# Patient Record
Sex: MALE | Race: WHITE | NOT HISPANIC OR LATINO | Employment: OTHER | URBAN - METROPOLITAN AREA
[De-identification: names, ages, dates, MRNs, and addresses within clinical notes are randomized per-mention and may not be internally consistent; named-entity substitution may affect disease eponyms.]

---

## 2020-02-04 ENCOUNTER — HOSPITAL ENCOUNTER (INPATIENT)
Facility: MEDICAL CENTER | Age: 70
LOS: 4 days | DRG: 871 | End: 2020-02-08
Attending: EMERGENCY MEDICINE | Admitting: INTERNAL MEDICINE
Payer: COMMERCIAL

## 2020-02-04 ENCOUNTER — APPOINTMENT (OUTPATIENT)
Dept: RADIOLOGY | Facility: MEDICAL CENTER | Age: 70
DRG: 871 | End: 2020-02-04
Attending: STUDENT IN AN ORGANIZED HEALTH CARE EDUCATION/TRAINING PROGRAM
Payer: COMMERCIAL

## 2020-02-04 ENCOUNTER — APPOINTMENT (OUTPATIENT)
Dept: CARDIOLOGY | Facility: MEDICAL CENTER | Age: 70
DRG: 871 | End: 2020-02-04
Attending: INTERNAL MEDICINE
Payer: COMMERCIAL

## 2020-02-04 ENCOUNTER — APPOINTMENT (OUTPATIENT)
Dept: RADIOLOGY | Facility: MEDICAL CENTER | Age: 70
DRG: 871 | End: 2020-02-04
Attending: EMERGENCY MEDICINE
Payer: COMMERCIAL

## 2020-02-04 ENCOUNTER — HOSPITAL ENCOUNTER (OUTPATIENT)
Dept: RADIOLOGY | Facility: MEDICAL CENTER | Age: 70
End: 2020-02-04

## 2020-02-04 DIAGNOSIS — K92.2 UPPER GI BLEED: ICD-10-CM

## 2020-02-04 DIAGNOSIS — D62 ANEMIA DUE TO BLOOD LOSS, ACUTE: ICD-10-CM

## 2020-02-04 DIAGNOSIS — I95.89 HYPOTENSION DUE TO HYPOVOLEMIA: ICD-10-CM

## 2020-02-04 DIAGNOSIS — E86.1 HYPOTENSION DUE TO HYPOVOLEMIA: ICD-10-CM

## 2020-02-04 DIAGNOSIS — A41.9 SEVERE SEPSIS (HCC): ICD-10-CM

## 2020-02-04 DIAGNOSIS — R65.20 SEVERE SEPSIS (HCC): ICD-10-CM

## 2020-02-04 PROBLEM — E87.20 METABOLIC ACIDOSIS: Status: ACTIVE | Noted: 2020-02-04

## 2020-02-04 PROBLEM — K92.1 GASTROINTESTINAL HEMORRHAGE WITH MELENA: Status: ACTIVE | Noted: 2020-02-04

## 2020-02-04 PROBLEM — E87.20 LACTIC ACIDOSIS: Status: ACTIVE | Noted: 2020-02-04

## 2020-02-04 PROBLEM — C85.90 NON HODGKIN'S LYMPHOMA (HCC): Status: ACTIVE | Noted: 2020-02-04

## 2020-02-04 PROBLEM — E46 MALNUTRITION (HCC): Status: ACTIVE | Noted: 2020-02-04

## 2020-02-04 PROBLEM — D72.829 LEUKOCYTOSIS: Status: ACTIVE | Noted: 2020-02-04

## 2020-02-04 PROBLEM — N17.9 AKI (ACUTE KIDNEY INJURY) (HCC): Status: ACTIVE | Noted: 2020-02-04

## 2020-02-04 PROBLEM — J44.9 COPD (CHRONIC OBSTRUCTIVE PULMONARY DISEASE) (HCC): Status: ACTIVE | Noted: 2020-02-04

## 2020-02-04 PROBLEM — R60.9 PERIPHERAL EDEMA: Status: ACTIVE | Noted: 2020-02-04

## 2020-02-04 PROBLEM — D64.9 ANEMIA: Status: ACTIVE | Noted: 2020-02-04

## 2020-02-04 LAB
ABO + RH BLD: NORMAL
ABO GROUP BLD: ABNORMAL
ALBUMIN SERPL BCP-MCNC: 1.9 G/DL (ref 3.2–4.9)
ALBUMIN/GLOB SERPL: 1.1 G/DL
ALP SERPL-CCNC: 32 U/L (ref 30–99)
ALT SERPL-CCNC: 19 U/L (ref 2–50)
ANION GAP SERPL CALC-SCNC: 9 MMOL/L (ref 0–11.9)
APPEARANCE UR: CLEAR
AST SERPL-CCNC: 22 U/L (ref 12–45)
BACTERIA #/AREA URNS HPF: NEGATIVE /HPF
BARCODED ABORH UBTYP: 5100
BARCODED PRD CODE UBPRD: ABNORMAL
BARCODED UNIT NUM UBUNT: ABNORMAL
BASOPHILS # BLD AUTO: 0 % (ref 0–1.8)
BASOPHILS # BLD: 0 K/UL (ref 0–0.12)
BILIRUB SERPL-MCNC: 1.3 MG/DL (ref 0.1–1.5)
BILIRUB UR QL STRIP.AUTO: NEGATIVE
BLD GP AB SCN SERPL QL: ABNORMAL
BUN SERPL-MCNC: 82 MG/DL (ref 8–22)
CALCIUM SERPL-MCNC: 8.5 MG/DL (ref 8.5–10.5)
CHLORIDE SERPL-SCNC: 112 MMOL/L (ref 96–112)
CK SERPL-CCNC: 22 U/L (ref 0–154)
CO2 SERPL-SCNC: 16 MMOL/L (ref 20–33)
COLOR UR: YELLOW
COMPONENT R 8504R: ABNORMAL
CREAT SERPL-MCNC: 1.82 MG/DL (ref 0.5–1.4)
EOSINOPHIL # BLD AUTO: 0 K/UL (ref 0–0.51)
EOSINOPHIL NFR BLD: 0 % (ref 0–6.9)
EPI CELLS #/AREA URNS HPF: NEGATIVE /HPF
ERYTHROCYTE [DISTWIDTH] IN BLOOD BY AUTOMATED COUNT: 52.5 FL (ref 35.9–50)
FERRITIN SERPL-MCNC: >1500 NG/ML (ref 22–322)
FLUAV RNA SPEC QL NAA+PROBE: NEGATIVE
FLUBV RNA SPEC QL NAA+PROBE: NEGATIVE
GLOBULIN SER CALC-MCNC: 1.8 G/DL (ref 1.9–3.5)
GLUCOSE SERPL-MCNC: 112 MG/DL (ref 65–99)
GLUCOSE UR STRIP.AUTO-MCNC: NEGATIVE MG/DL
GRAM STN SPEC: NORMAL
HCT VFR BLD AUTO: 20.6 % (ref 42–52)
HCT VFR BLD AUTO: 23.6 % (ref 42–52)
HGB BLD-MCNC: 6.4 G/DL (ref 14–18)
HGB BLD-MCNC: 7.2 G/DL (ref 14–18)
HYALINE CASTS #/AREA URNS LPF: ABNORMAL /LPF
INR PPP: 1.49 (ref 0.87–1.13)
IRON SATN MFR SERPL: 90 % (ref 15–55)
IRON SERPL-MCNC: 164 UG/DL (ref 50–180)
KETONES UR STRIP.AUTO-MCNC: NEGATIVE MG/DL
LACTATE BLD-SCNC: 2.1 MMOL/L (ref 0.5–2)
LACTATE BLD-SCNC: 3.4 MMOL/L (ref 0.5–2)
LACTATE BLD-SCNC: 3.8 MMOL/L (ref 0.5–2)
LACTATE BLD-SCNC: 5.4 MMOL/L (ref 0.5–2)
LDH SERPL L TO P-CCNC: 356 U/L (ref 107–266)
LEUKOCYTE ESTERASE UR QL STRIP.AUTO: ABNORMAL
LV EJECT FRACT  99904: 55
LV EJECT FRACT MOD 2C 99903: 67.46
LV EJECT FRACT MOD 4C 99902: 65.78
LV EJECT FRACT MOD BP 99901: 66.67
LYMPHOCYTES # BLD AUTO: 24.22 K/UL (ref 1–4.8)
LYMPHOCYTES NFR BLD: 70.4 % (ref 22–41)
MANUAL DIFF BLD: NORMAL
MCH RBC QN AUTO: 32 PG (ref 27–33)
MCHC RBC AUTO-ENTMCNC: 31.1 G/DL (ref 33.7–35.3)
MCV RBC AUTO: 103 FL (ref 81.4–97.8)
MICRO URNS: ABNORMAL
MONOCYTES # BLD AUTO: 0.31 K/UL (ref 0–0.85)
MONOCYTES NFR BLD AUTO: 0.9 % (ref 0–13.4)
MORPHOLOGY BLD-IMP: NORMAL
NEUTROPHILS # BLD AUTO: 9.87 K/UL (ref 1.82–7.42)
NEUTROPHILS NFR BLD: 27.8 % (ref 44–72)
NEUTS BAND NFR BLD MANUAL: 0.9 % (ref 0–10)
NITRITE UR QL STRIP.AUTO: NEGATIVE
NRBC # BLD AUTO: 0.07 K/UL
NRBC BLD-RTO: 0.2 /100 WBC
PH UR STRIP.AUTO: 5 [PH] (ref 5–8)
PLATELET # BLD AUTO: 112 K/UL (ref 164–446)
PLATELET BLD QL SMEAR: NORMAL
PMV BLD AUTO: 10 FL (ref 9–12.9)
POTASSIUM SERPL-SCNC: 4.5 MMOL/L (ref 3.6–5.5)
PROCALCITONIN SERPL-MCNC: 1.08 NG/ML
PRODUCT TYPE UPROD: ABNORMAL
PROT SERPL-MCNC: 3.7 G/DL (ref 6–8.2)
PROT UR QL STRIP: NEGATIVE MG/DL
PROTHROMBIN TIME: 18.4 SEC (ref 12–14.6)
RBC # BLD AUTO: 2 M/UL (ref 4.7–6.1)
RBC # URNS HPF: ABNORMAL /HPF
RBC UR QL AUTO: NEGATIVE
RH BLD: ABNORMAL
SIGNIFICANT IND 70042: NORMAL
SITE SITE: NORMAL
SODIUM SERPL-SCNC: 137 MMOL/L (ref 135–145)
SOURCE SOURCE: NORMAL
SP GR UR STRIP.AUTO: 1.01
TIBC SERPL-MCNC: 182 UG/DL (ref 250–450)
TROPONIN T SERPL-MCNC: 47 NG/L (ref 6–19)
UNIT STATUS USTAT: ABNORMAL
URATE SERPL-MCNC: 11.8 MG/DL (ref 2.5–8.3)
UROBILINOGEN UR STRIP.AUTO-MCNC: 0.2 MG/DL
WBC # BLD AUTO: 34.4 K/UL (ref 4.8–10.8)
WBC #/AREA URNS HPF: ABNORMAL /HPF

## 2020-02-04 PROCEDURE — 76775 US EXAM ABDO BACK WALL LIM: CPT

## 2020-02-04 PROCEDURE — 99292 CRITICAL CARE ADDL 30 MIN: CPT | Performed by: INTERNAL MEDICINE

## 2020-02-04 PROCEDURE — C9113 INJ PANTOPRAZOLE SODIUM, VIA: HCPCS | Performed by: EMERGENCY MEDICINE

## 2020-02-04 PROCEDURE — 93306 TTE W/DOPPLER COMPLETE: CPT | Mod: 26 | Performed by: INTERNAL MEDICINE

## 2020-02-04 PROCEDURE — 87040 BLOOD CULTURE FOR BACTERIA: CPT | Mod: 91

## 2020-02-04 PROCEDURE — 80053 COMPREHEN METABOLIC PANEL: CPT

## 2020-02-04 PROCEDURE — 85014 HEMATOCRIT: CPT

## 2020-02-04 PROCEDURE — A9270 NON-COVERED ITEM OR SERVICE: HCPCS | Performed by: STUDENT IN AN ORGANIZED HEALTH CARE EDUCATION/TRAINING PROGRAM

## 2020-02-04 PROCEDURE — 30233N1 TRANSFUSION OF NONAUTOLOGOUS RED BLOOD CELLS INTO PERIPHERAL VEIN, PERCUTANEOUS APPROACH: ICD-10-PCS | Performed by: EMERGENCY MEDICINE

## 2020-02-04 PROCEDURE — 700102 HCHG RX REV CODE 250 W/ 637 OVERRIDE(OP): Performed by: STUDENT IN AN ORGANIZED HEALTH CARE EDUCATION/TRAINING PROGRAM

## 2020-02-04 PROCEDURE — 93306 TTE W/DOPPLER COMPLETE: CPT

## 2020-02-04 PROCEDURE — 84145 PROCALCITONIN (PCT): CPT

## 2020-02-04 PROCEDURE — 83550 IRON BINDING TEST: CPT

## 2020-02-04 PROCEDURE — 84550 ASSAY OF BLOOD/URIC ACID: CPT

## 2020-02-04 PROCEDURE — 87186 SC STD MICRODIL/AGAR DIL: CPT

## 2020-02-04 PROCEDURE — 85027 COMPLETE CBC AUTOMATED: CPT

## 2020-02-04 PROCEDURE — 86900 BLOOD TYPING SEROLOGIC ABO: CPT

## 2020-02-04 PROCEDURE — 99291 CRITICAL CARE FIRST HOUR: CPT | Performed by: INTERNAL MEDICINE

## 2020-02-04 PROCEDURE — 99291 CRITICAL CARE FIRST HOUR: CPT

## 2020-02-04 PROCEDURE — 85610 PROTHROMBIN TIME: CPT

## 2020-02-04 PROCEDURE — 87205 SMEAR GRAM STAIN: CPT

## 2020-02-04 PROCEDURE — 700105 HCHG RX REV CODE 258: Performed by: EMERGENCY MEDICINE

## 2020-02-04 PROCEDURE — 770022 HCHG ROOM/CARE - ICU (200)

## 2020-02-04 PROCEDURE — 81001 URINALYSIS AUTO W/SCOPE: CPT

## 2020-02-04 PROCEDURE — 96366 THER/PROPH/DIAG IV INF ADDON: CPT

## 2020-02-04 PROCEDURE — P9016 RBC LEUKOCYTES REDUCED: HCPCS

## 2020-02-04 PROCEDURE — 82728 ASSAY OF FERRITIN: CPT

## 2020-02-04 PROCEDURE — 83010 ASSAY OF HAPTOGLOBIN QUANT: CPT

## 2020-02-04 PROCEDURE — 87086 URINE CULTURE/COLONY COUNT: CPT

## 2020-02-04 PROCEDURE — 85018 HEMOGLOBIN: CPT

## 2020-02-04 PROCEDURE — 85007 BL SMEAR W/DIFF WBC COUNT: CPT

## 2020-02-04 PROCEDURE — 87502 INFLUENZA DNA AMP PROBE: CPT

## 2020-02-04 PROCEDURE — 87077 CULTURE AEROBIC IDENTIFY: CPT

## 2020-02-04 PROCEDURE — 82550 ASSAY OF CK (CPK): CPT

## 2020-02-04 PROCEDURE — 36430 TRANSFUSION BLD/BLD COMPNT: CPT

## 2020-02-04 PROCEDURE — 86923 COMPATIBILITY TEST ELECTRIC: CPT

## 2020-02-04 PROCEDURE — 86850 RBC ANTIBODY SCREEN: CPT

## 2020-02-04 PROCEDURE — 83615 LACTATE (LD) (LDH) ENZYME: CPT

## 2020-02-04 PROCEDURE — 84484 ASSAY OF TROPONIN QUANT: CPT

## 2020-02-04 PROCEDURE — 86945 BLOOD PRODUCT/IRRADIATION: CPT

## 2020-02-04 PROCEDURE — 71045 X-RAY EXAM CHEST 1 VIEW: CPT

## 2020-02-04 PROCEDURE — 86901 BLOOD TYPING SEROLOGIC RH(D): CPT

## 2020-02-04 PROCEDURE — 96365 THER/PROPH/DIAG IV INF INIT: CPT

## 2020-02-04 PROCEDURE — 700111 HCHG RX REV CODE 636 W/ 250 OVERRIDE (IP): Performed by: EMERGENCY MEDICINE

## 2020-02-04 PROCEDURE — 700105 HCHG RX REV CODE 258: Performed by: STUDENT IN AN ORGANIZED HEALTH CARE EDUCATION/TRAINING PROGRAM

## 2020-02-04 PROCEDURE — C9113 INJ PANTOPRAZOLE SODIUM, VIA: HCPCS | Performed by: STUDENT IN AN ORGANIZED HEALTH CARE EDUCATION/TRAINING PROGRAM

## 2020-02-04 PROCEDURE — 96367 TX/PROPH/DG ADDL SEQ IV INF: CPT

## 2020-02-04 PROCEDURE — 700111 HCHG RX REV CODE 636 W/ 250 OVERRIDE (IP): Performed by: STUDENT IN AN ORGANIZED HEALTH CARE EDUCATION/TRAINING PROGRAM

## 2020-02-04 PROCEDURE — 83605 ASSAY OF LACTIC ACID: CPT | Mod: 91

## 2020-02-04 PROCEDURE — 83540 ASSAY OF IRON: CPT

## 2020-02-04 RX ORDER — SODIUM CHLORIDE, SODIUM LACTATE, POTASSIUM CHLORIDE, CALCIUM CHLORIDE 600; 310; 30; 20 MG/100ML; MG/100ML; MG/100ML; MG/100ML
INJECTION, SOLUTION INTRAVENOUS CONTINUOUS
Status: DISCONTINUED | OUTPATIENT
Start: 2020-02-04 | End: 2020-02-05

## 2020-02-04 RX ORDER — IBUPROFEN 200 MG
800 TABLET ORAL EVERY 6 HOURS PRN
Status: ON HOLD | COMMUNITY
End: 2020-02-08

## 2020-02-04 RX ORDER — SODIUM CHLORIDE, SODIUM LACTATE, POTASSIUM CHLORIDE, CALCIUM CHLORIDE 600; 310; 30; 20 MG/100ML; MG/100ML; MG/100ML; MG/100ML
1000 INJECTION, SOLUTION INTRAVENOUS ONCE
Status: COMPLETED | OUTPATIENT
Start: 2020-02-04 | End: 2020-02-04

## 2020-02-04 RX ORDER — IPRATROPIUM BROMIDE AND ALBUTEROL SULFATE 2.5; .5 MG/3ML; MG/3ML
3 SOLUTION RESPIRATORY (INHALATION) EVERY 4 HOURS PRN
COMMUNITY

## 2020-02-04 RX ORDER — FINASTERIDE 5 MG/1
5 TABLET, FILM COATED ORAL EVERY EVENING
Status: DISCONTINUED | OUTPATIENT
Start: 2020-02-04 | End: 2020-02-08 | Stop reason: HOSPADM

## 2020-02-04 RX ORDER — DOXYCYCLINE 100 MG/1
100 TABLET ORAL EVERY 12 HOURS
Status: DISCONTINUED | OUTPATIENT
Start: 2020-02-04 | End: 2020-02-05

## 2020-02-04 RX ORDER — ALBUTEROL SULFATE 90 UG/1
2 AEROSOL, METERED RESPIRATORY (INHALATION) EVERY 6 HOURS PRN
COMMUNITY

## 2020-02-04 RX ORDER — TAMSULOSIN HYDROCHLORIDE 0.4 MG/1
0.4 CAPSULE ORAL EVERY EVENING
COMMUNITY

## 2020-02-04 RX ORDER — PANTOPRAZOLE SODIUM 40 MG/10ML
40 INJECTION, POWDER, LYOPHILIZED, FOR SOLUTION INTRAVENOUS 2 TIMES DAILY
Status: DISCONTINUED | OUTPATIENT
Start: 2020-02-04 | End: 2020-02-08 | Stop reason: HOSPADM

## 2020-02-04 RX ORDER — ALBUTEROL SULFATE 90 UG/1
2 AEROSOL, METERED RESPIRATORY (INHALATION) EVERY 6 HOURS PRN
Status: DISCONTINUED | OUTPATIENT
Start: 2020-02-04 | End: 2020-02-08 | Stop reason: HOSPADM

## 2020-02-04 RX ORDER — FINASTERIDE 5 MG/1
5 TABLET, FILM COATED ORAL EVERY EVENING
COMMUNITY

## 2020-02-04 RX ADMIN — FINASTERIDE 5 MG: 5 TABLET, FILM COATED ORAL at 17:32

## 2020-02-04 RX ADMIN — SODIUM CHLORIDE, POTASSIUM CHLORIDE, SODIUM LACTATE AND CALCIUM CHLORIDE: 600; 310; 30; 20 INJECTION, SOLUTION INTRAVENOUS at 20:47

## 2020-02-04 RX ADMIN — PANTOPRAZOLE SODIUM 40 MG: 40 INJECTION, POWDER, LYOPHILIZED, FOR SOLUTION INTRAVENOUS at 14:14

## 2020-02-04 RX ADMIN — SODIUM CHLORIDE, POTASSIUM CHLORIDE, SODIUM LACTATE AND CALCIUM CHLORIDE 1000 ML: 600; 310; 30; 20 INJECTION, SOLUTION INTRAVENOUS at 08:24

## 2020-02-04 RX ADMIN — DOXYCYCLINE 100 MG: 100 TABLET, FILM COATED ORAL at 17:32

## 2020-02-04 RX ADMIN — SODIUM CHLORIDE 8 MG/HR: 9 INJECTION, SOLUTION INTRAVENOUS at 09:02

## 2020-02-04 RX ADMIN — DOXYCYCLINE 100 MG: 100 TABLET, FILM COATED ORAL at 12:28

## 2020-02-04 RX ADMIN — PANTOPRAZOLE SODIUM 40 MG: 40 INJECTION, POWDER, LYOPHILIZED, FOR SOLUTION INTRAVENOUS at 20:18

## 2020-02-04 RX ADMIN — CEFTRIAXONE SODIUM 1 G: 1 INJECTION, POWDER, FOR SOLUTION INTRAMUSCULAR; INTRAVENOUS at 08:23

## 2020-02-04 RX ADMIN — ALBUTEROL SULFATE 2 PUFF: 90 AEROSOL, METERED RESPIRATORY (INHALATION) at 20:20

## 2020-02-04 RX ADMIN — DESMOPRESSIN ACETATE 20 MCG: 4 SOLUTION INTRAVENOUS at 14:14

## 2020-02-04 RX ADMIN — SODIUM CHLORIDE, POTASSIUM CHLORIDE, SODIUM LACTATE AND CALCIUM CHLORIDE: 600; 310; 30; 20 INJECTION, SOLUTION INTRAVENOUS at 12:13

## 2020-02-04 SDOH — ECONOMIC STABILITY: FOOD INSECURITY: WITHIN THE PAST 12 MONTHS, THE FOOD YOU BOUGHT JUST DIDN'T LAST AND YOU DIDN'T HAVE MONEY TO GET MORE.: NEVER TRUE

## 2020-02-04 SDOH — ECONOMIC STABILITY: FOOD INSECURITY: WITHIN THE PAST 12 MONTHS, YOU WORRIED THAT YOUR FOOD WOULD RUN OUT BEFORE YOU GOT MONEY TO BUY MORE.: NEVER TRUE

## 2020-02-04 ASSESSMENT — ENCOUNTER SYMPTOMS
PALPITATIONS: 0
HEARTBURN: 1
FEVER: 1
DIAPHORESIS: 0
FOCAL WEAKNESS: 0
SHORTNESS OF BREATH: 1
HEMOPTYSIS: 0
CONSTIPATION: 0
EYE DISCHARGE: 0
DEPRESSION: 0
HEADACHES: 0
MYALGIAS: 0
BLOOD IN STOOL: 0
SEIZURES: 0
SPEECH CHANGE: 0
ABDOMINAL PAIN: 0
PND: 1
FLANK PAIN: 0
DIZZINESS: 1
DEPRESSION: 1
SPUTUM PRODUCTION: 0
DOUBLE VISION: 0
BACK PAIN: 0
WEIGHT LOSS: 1
CLAUDICATION: 0
NAUSEA: 0
SENSORY CHANGE: 0
COUGH: 1
WHEEZING: 0
NERVOUS/ANXIOUS: 0
ORTHOPNEA: 1
DIARRHEA: 0
BLURRED VISION: 0
VOMITING: 0
WEAKNESS: 0
BRUISES/BLEEDS EASILY: 1
TREMORS: 0
HEARTBURN: 0
CHILLS: 0

## 2020-02-04 ASSESSMENT — LIFESTYLE VARIABLES
TOTAL SCORE: 0
DO YOU DRINK ALCOHOL: NO
HAVE PEOPLE ANNOYED YOU BY CRITICIZING YOUR DRINKING: NO
AVERAGE NUMBER OF DAYS PER WEEK YOU HAVE A DRINK CONTAINING ALCOHOL: 0
TOTAL SCORE: 0
CONSUMPTION TOTAL: NEGATIVE
EVER HAD A DRINK FIRST THING IN THE MORNING TO STEADY YOUR NERVES TO GET RID OF A HANGOVER: NO
ON A TYPICAL DAY WHEN YOU DRINK ALCOHOL HOW MANY DRINKS DO YOU HAVE: 0
TOTAL SCORE: 0
HAVE YOU EVER FELT YOU SHOULD CUT DOWN ON YOUR DRINKING: NO
ALCOHOL_USE: NO
HAVE YOU EVER FELT YOU SHOULD CUT DOWN ON YOUR DRINKING: NO
EVER HAD A DRINK FIRST THING IN THE MORNING TO STEADY YOUR NERVES TO GET RID OF A HANGOVER: NO
EVER FELT BAD OR GUILTY ABOUT YOUR DRINKING: NO
TOTAL SCORE: 0
EVER_SMOKED: YES
HOW MANY TIMES IN THE PAST YEAR HAVE YOU HAD 5 OR MORE DRINKS IN A DAY: 0
HAVE PEOPLE ANNOYED YOU BY CRITICIZING YOUR DRINKING: NO
EVER_SMOKED: YES
TOTAL SCORE: 0
CONSUMPTION TOTAL: INCOMPLETE
DOES PATIENT WANT TO STOP DRINKING: NO
SUBSTANCE_ABUSE: 0
TOTAL SCORE: 0
EVER FELT BAD OR GUILTY ABOUT YOUR DRINKING: NO

## 2020-02-04 ASSESSMENT — COPD QUESTIONNAIRES
DURING THE PAST 4 WEEKS HOW MUCH DID YOU FEEL SHORT OF BREATH: NONE/LITTLE OF THE TIME
COPD SCREENING SCORE: 5
DO YOU EVER COUGH UP ANY MUCUS OR PHLEGM?: NO/ONLY WITH OCCASIONAL COLDS OR INFECTIONS
HAVE YOU SMOKED AT LEAST 100 CIGARETTES IN YOUR ENTIRE LIFE: YES

## 2020-02-04 ASSESSMENT — PATIENT HEALTH QUESTIONNAIRE - PHQ9
2. FEELING DOWN, DEPRESSED, IRRITABLE, OR HOPELESS: NOT AT ALL
1. LITTLE INTEREST OR PLEASURE IN DOING THINGS: NOT AT ALL
SUM OF ALL RESPONSES TO PHQ9 QUESTIONS 1 AND 2: 0

## 2020-02-04 NOTE — ASSESSMENT & PLAN NOTE
- resolved  - SIRS 3/4 on admission for tachycardia, tachypnea, leukocytosis  - elevated pro calcitonin at 1.08  - CXR showed diffuse interstitial opacities  Plan:  - blood cx negative  - leukocytosis likely reactive from underlying NHL and acute GI bleed  - f/u  Sputum  - urine cx with Staph

## 2020-02-04 NOTE — CONSULTS
Critical Care Consultation    Date of consult: 2/4/2020    Referring Physician  Karthik Young M.D.    Reason for Consultation  GI bleed with hemorrhagic shock    History of Presenting Illness  69 y.o. male who presented 2/4/2020 with Hx of NHL, COPD with prior tobacco and quit 10 yrs ago, BPH and urinary difficulty and needs self craterization.Patient was recent hospitalized 2x for COPD, PNA/flu in [facility]. He has had fever shortness of breath, pnd, increase leg swelling and 7 days of black or dark stools. No prior GI bleed or liver disease dose bruise easily. A couple dose of nsaids after this started. Describes dizziness with standing went to [facility] today found a hg of 3.6 BUN of 73 and creatine of 2.0 and SBP was in the 70's given 2 units prbc and 2l of fluids and transferred here for further evaluation. On presentation here he had hr of 115 sbp of 85 on 0.5l n/c. He only complains of difficulty breathing which is improved post blood transfusion. He has had significant blood loss. Bedside echo with hyperdynamic biV function and IVC completely kissing with increase work of breathing with bilateral b-lines 6-8 inferiorly.     Code Status  Full Code    Review of Systems  Review of Systems   Constitutional: Positive for fever and weight loss. Negative for chills and diaphoresis.   HENT: Negative for congestion, hearing loss and nosebleeds.    Eyes: Negative for double vision and discharge.   Respiratory: Positive for cough and shortness of breath. Negative for hemoptysis, sputum production and wheezing.    Cardiovascular: Positive for orthopnea, leg swelling and PND. Negative for chest pain, palpitations and claudication.   Gastrointestinal: Positive for melena. Negative for abdominal pain, blood in stool, constipation, diarrhea, heartburn, nausea and vomiting.   Genitourinary: Negative for dysuria, flank pain, frequency, hematuria and urgency.   Musculoskeletal: Negative for back pain, joint pain and  myalgias.   Skin: Negative for rash.   Neurological: Positive for dizziness. Negative for tremors, sensory change, focal weakness, seizures, weakness and headaches.   Endo/Heme/Allergies: Bruises/bleeds easily.   Psychiatric/Behavioral: Negative for depression and substance abuse. The patient is not nervous/anxious.        Past Medical History   has no past medical history on file.    Surgical History   has no past surgical history on file.    Family History  family history is not on file.    Social History       Medications  Home Medications     Reviewed by Prince Prajapati (Pharmacy Tech) on 02/04/20 at 0810  Med List Status: Complete   Medication Last Dose Status   albuterol 108 (90 Base) MCG/ACT Aero Soln inhalation aerosol 2/4/2020 Active   finasteride (PROSCAR) 5 MG Tab 2/2/2020 Active   ibuprofen (MOTRIN) 200 MG Tab 2/1/2020 Active   ipratropium-albuterol (DUONEB) 0.5-2.5 (3) MG/3ML nebulizer solution 2/4/2020 Active   tamsulosin (FLOMAX) 0.4 MG capsule 2/2/2020 Active              Current Facility-Administered Medications   Medication Dose Route Frequency Provider Last Rate Last Dose   • Respiratory Care per Protocol   Nebulization Continuous RT Terrie Angeles M.D.       • lactated ringers infusion   Intravenous Continuous Terrie Angeles M.D.       • desmopressin (DDAVP) 20 mcg in NS 50 mL ivpb  20 mcg Intravenous Once Terrie Angeles M.D.       • pantoprazole (PROTONIX) injection 40 mg  40 mg Intravenous BID Terrie Angeles M.D.       • cefTRIAXone (ROCEPHIN) 2 g in  mL IVPB  2 g Intravenous Q24HRS Terrie Angeles M.D.       • doxycycline monohydrate (ADOXA) tablet 100 mg  100 mg Oral Q12HRS Terrie Angeles M.D.         Current Outpatient Medications   Medication Sig Dispense Refill   • tamsulosin (FLOMAX) 0.4 MG capsule Take 0.4 mg by mouth every evening.     • finasteride (PROSCAR) 5 MG Tab Take 5 mg by mouth every evening.     • ibuprofen (MOTRIN) 200 MG Tab Take 800 mg by mouth every 6 hours as  "needed.     • ipratropium-albuterol (DUONEB) 0.5-2.5 (3) MG/3ML nebulizer solution 3 mL by Nebulization route every four hours as needed for Shortness of Breath.     • albuterol 108 (90 Base) MCG/ACT Aero Soln inhalation aerosol Inhale 2 Puffs by mouth every 6 hours as needed for Shortness of Breath.         Allergies  Allergies   Allergen Reactions   • Pcn [Penicillins] Unspecified     \"childhood\"  Tolerated ceftriaxone 2/4/2019       Vital Signs last 24 hours  Temp:  [38 °C (100.4 °F)] 38 °C (100.4 °F)  Pulse:  [106-115] 106  Resp:  [18-23] 23  BP: ()/(50-61) 104/59  SpO2:  [95 %-98 %] 96 %    Physical Exam  Physical Exam  Constitutional:       General: He is in acute distress.      Appearance: He is ill-appearing.      Comments: Ill appearing male in mild distress with family at bedside   HENT:      Head: Normocephalic.      Nose: Nose normal.      Mouth/Throat:      Mouth: Mucous membranes are moist.   Eyes:      Extraocular Movements: Extraocular movements intact.      Pupils: Pupils are equal, round, and reactive to light.   Neck:      Musculoskeletal: No neck rigidity or muscular tenderness.   Cardiovascular:      Rate and Rhythm: Tachycardia present.      Heart sounds: No murmur.   Pulmonary:      Effort: Respiratory distress present.      Breath sounds: No stridor. Rales present. No wheezing or rhonchi.   Abdominal:      General: There is no distension.      Palpations: There is no mass.      Tenderness: There is no tenderness. There is no guarding or rebound.      Hernia: No hernia is present.   Musculoskeletal:         General: Swelling present.      Right lower leg: Edema present.      Left lower leg: Edema present.   Skin:     Coloration: Skin is pale. Skin is not jaundiced.      Findings: No bruising or erythema.   Neurological:      Mental Status: He is oriented to person, place, and time.      Cranial Nerves: No cranial nerve deficit.      Sensory: No sensory deficit.      Motor: No weakness. "      Coordination: Coordination normal.   Psychiatric:         Mood and Affect: Mood normal.         Fluids    Intake/Output Summary (Last 24 hours) at 2/4/2020 1031  Last data filed at 2/4/2020 0914  Gross per 24 hour   Intake 1100 ml   Output --   Net 1100 ml       Laboratory  Recent Results (from the past 48 hour(s))   Lactic acid (lactate)    Collection Time: 02/04/20  7:45 AM   Result Value Ref Range    Lactic Acid 5.4 (HH) 0.5 - 2.0 mmol/L   COMP METABOLIC PANEL    Collection Time: 02/04/20  7:45 AM   Result Value Ref Range    Sodium 137 135 - 145 mmol/L    Potassium 4.5 3.6 - 5.5 mmol/L    Chloride 112 96 - 112 mmol/L    Co2 16 (L) 20 - 33 mmol/L    Anion Gap 9.0 0.0 - 11.9    Glucose 112 (H) 65 - 99 mg/dL    Bun 82 (HH) 8 - 22 mg/dL    Creatinine 1.82 (H) 0.50 - 1.40 mg/dL    Calcium 8.5 8.5 - 10.5 mg/dL    AST(SGOT) 22 12 - 45 U/L    ALT(SGPT) 19 2 - 50 U/L    Alkaline Phosphatase 32 30 - 99 U/L    Total Bilirubin 1.3 0.1 - 1.5 mg/dL    Albumin 1.9 (L) 3.2 - 4.9 g/dL    Total Protein 3.7 (L) 6.0 - 8.2 g/dL    Globulin 1.8 (L) 1.9 - 3.5 g/dL    A-G Ratio 1.1 g/dL   Prothrombin Time    Collection Time: 02/04/20  7:45 AM   Result Value Ref Range    PT 18.4 (H) 12.0 - 14.6 sec    INR 1.49 (H) 0.87 - 1.13   ESTIMATED GFR    Collection Time: 02/04/20  7:45 AM   Result Value Ref Range    GFR If African American 45 (A) >60 mL/min/1.73 m 2    GFR If Non  37 (A) >60 mL/min/1.73 m 2   URINALYSIS    Collection Time: 02/04/20  7:55 AM   Result Value Ref Range    Color Yellow     Character Clear     Specific Gravity 1.015 <1.035    Ph 5.0 5.0 - 8.0    Glucose Negative Negative mg/dL    Ketones Negative Negative mg/dL    Protein Negative Negative mg/dL    Bilirubin Negative Negative    Urobilinogen, Urine 0.2 Negative    Nitrite Negative Negative    Leukocyte Esterase Trace (A) Negative    Occult Blood Negative Negative    Micro Urine Req Microscopic    URINE MICROSCOPIC (W/UA)    Collection Time: 02/04/20   7:55 AM   Result Value Ref Range    WBC 5-10 (A) /hpf    RBC 0-2 (A) /hpf    Bacteria Negative None /hpf    Epithelial Cells Negative /hpf    Hyaline Cast 0-2 /lpf   Lactic acid (lactate): Repeat if initial lactic acid result is greater than 2    Collection Time: 02/04/20  9:55 AM   Result Value Ref Range    Lactic Acid 3.4 (H) 0.5 - 2.0 mmol/L       Imaging  OUTSIDE IMAGES-DX CHEST   Final Result      DX-CHEST-PORTABLE (1 VIEW)   Final Result      1.  Diffuse interstitial opacities.      2.  Hyperinflated lungs.      3.  Bibasilar atelectasis versus scarring.      EC-ECHOCARDIOGRAM COMPLETE W/ CONT    (Results Pending)       Assessment/Plan  Gastrointestinal hemorrhage with melena  Assessment & Plan  Hx of gerd now with 1 wk of black stool and acute hemorrhagic shock  Serial monitor hg and transfuse hg < 7.0 careful with volume and need for diuresis  Check TEG w/ mapping give DDAVP for dysfunctional platelets  GI consult  protonix gtt  Monitor shock index and need for further blood products currently improving      Sepsis ([facility])  Assessment & Plan  This is Sepsis Present on admission  SIRS criteria identified on my evaluation include: Tachycardia, with heart rate greater than 90 BPM and Tachypnea, with respirations greater than 20 per minute  Source is unclear  Sepsis protocol initiated  Fluid resuscitation ordered per protocol  IV antibiotics as appropriate for source of sepsis  While organ dysfunction may be noted elsewhere in this problem list or in the chart, degree of organ dysfunction does not meet CMS criteria for severe sepsis    Patient with warm shock out of porportion to hemorrhagic shock  Will check blood cultures, MRSA nares and start antiobiotics  Continue to monitor fever curve and rapid de-escalate          EUFEMIA (acute kidney injury) ([facility])  Assessment & Plan  Unclear baseline   Maintain euvolemia and monitor fluid responsiveness (avoid NaCL and renal congestion)  MAP > 65 uses pressors or inotropic  trial  Monitor urine output and I&O's  Avoid and review nephrotoxin medication  Rule out post obstruction  Consider renal U/S if no renal images  U/a and CPK    One dose of DDAVP for uremic platelets    Peripheral edema  Assessment & Plan  Patient with edema and orthopnea and pnd  Check official cardiac ultrasound    Malnutrition (HCC)  Assessment & Plan  Significant weight loss  Nutrition consult    Metabolic acidosis  Assessment & Plan  Related to shock,shabbir and lactate acidosis  resus with bicarb rich fluid  Serial monitor    Non Hodgkin's lymphoma (HCC)  Assessment & Plan  Hx of followed at [facility]    COPD (chronic obstructive pulmonary disease) (HCC)  Assessment & Plan  Hx of with benign CXR findings currently concern for volume overload  Monitor need for nebulizer or steroids currently not in acute excerbation      Anemia  Assessment & Plan  Acute related to blood loss and GI bleed  Check retic count, Bili not elevated to suggest lysis  Serial monitor and transfuse may need iron supplementation         Discussed patient condition and risk of morbidity and/or mortality with Family, RN, Pharmacy, [facility] resident and Patient.    The patient remains critically ill from hemorrhagic shock and need for blood transfusion and close monitor of shock index.  Critical care time = 80 minutes in directly providing and coordinating critical care and extensive data review.  No time overlap and excludes procedures.

## 2020-02-04 NOTE — ASSESSMENT & PLAN NOTE
Initially started on antibiotics given shock and high WBC  No focal symptoms suggestive of infection, now fever  WBC pending for today  D/c antibiotics given largely improved clinical condition

## 2020-02-04 NOTE — ED TRIAGE NOTES
Patient was a transfer from [place], bloody stools for weeks, H/H 3/12, patient got RBC x2 and 1 NS before arrival, was hospitalized recently for pneumonia copd. BP 85/61 , temp 100.4f

## 2020-02-04 NOTE — ASSESSMENT & PLAN NOTE
- h/o NHL since 20 years  - only on surveillance, was never treated  - f/u with Dr. Danielson at [facility]  Plan:  - given anemia, leukocytosis, weight loss- ordered LDH and uric acid which are elevated  - Renal u/s showed marked splenomegaly with complex cystic mass measuring 5.8 cm in diameter.  - to be f/u by heam/onc as an outpatient

## 2020-02-04 NOTE — ASSESSMENT & PLAN NOTE
- likely from acute blood loss anemia  - also has orthopnea, PND and CXR showed b/l interstitial opacities  - echo showed an EF of 55% with normal regional wall motion  - resolved

## 2020-02-04 NOTE — ASSESSMENT & PLAN NOTE
- severe protein calorie malnutrition  - low albumin levels  - f/u pre albumin levels  - nutrition consult placed for weight loss   - BOOST

## 2020-02-04 NOTE — ASSESSMENT & PLAN NOTE
- improving  - likely secondary to lactic acidosis from hemorrhagic shock from acute anemia     right eyebrow, left palm

## 2020-02-04 NOTE — ED PROVIDER NOTES
ED Provider Note    ED Provider Note    Primary care provider: Pcp Pt States None  Means of arrival: EMS/transfer  History obtained from: Patient/medical record    CHIEF COMPLAINT  Chief Complaint   Patient presents with   • Bloody Stools     Hgb 3     Seen at 7:53 AM.   HPI  Richard Chang is a 69 y.o. male who presents to the Emergency Department as a transfer for GI bleed, sepsis, pneumonia.  He presented to [facility] emergency department with shortness of breath and generalized weakness for the past few weeks.  He also reported some dark stools for the past few days.  The patient denies any fevers but was told he was febrile at the outlying department.  He denies any recent cough.  He was evaluated for COPD on January 1, he was discharged after 1 day.  He was admitted for 5 days in mid January, the patient reports he has been home for the past week.  He felt well initially after discharge but had developing malaise and shortness of breath over the past few days.  He denies any recent cough.  He denies any recent alcohol use, he does not smoke cigarettes in several months, he denies any recent anti-inflammatory use.  No prior history of GI bleed.  He does not recall ever having an endoscopy or colonoscopy and he does not funds under the care of any other gastroenterologist.  Overall he feels improved now compared to when he first presented to the emergency department overnight.        The patient received 80 mg of IV Protonix, 1 g of vancomycin, 25 mg of diphenhydramine, 2 L of normal saline, 2 units PRBC.    Labs at the outlying facility are notable for WBC of 38.1, hemoglobin 3.6, platelets 163, normal differential, INR 1.6, creatinine 2.0, CO2 16, normal LFTs, troponin negative, lactate 9.8, occult blood positive.  Chest x-ray shows bilateral pulmonary infiltrates.    REVIEW OF SYSTEMS  See HPI,   Remainder of ROS negative.     PAST MEDICAL HISTORY   COPD    SURGICAL HISTORY  patient denies any surgical  "history    SOCIAL HISTORY  Social History     Tobacco Use   • Smoking status: Not on file   Substance Use Topics   • Alcohol use: Not on file   • Drug use: Not on file      Social History     Substance and Sexual Activity   Drug Use Not on file       FAMILY HISTORY  No family history on file.    CURRENT MEDICATIONS  Reviewed.  See Encounter Summary.     ALLERGIES  Allergies   Allergen Reactions   • Pcn [Penicillins] Unspecified     \"childhood\"  Tolerated ceftriaxone 2/4/2019       PHYSICAL EXAM  VITAL SIGNS: BP (!) 92/58   Pulse 98   Temp 38 °C (100.4 °F) (Temporal)   Resp (!) 24   Ht 1.854 m (6' 1\")   Wt 68 kg (150 lb)   SpO2 99%   BMI 19.79 kg/m²   Constitutional: Awake, alert in no apparent distress.  Sitting up in bed, mentating well.  HENT: Normocephalic, Bilateral external ears normal. Nose normal.   Eyes: Conjunctiva normal, non-icteric, EOMI.    Thorax & Lungs: Easy unlabored respirations, Clear to ascultation bilaterally.  Cardiovascular: Regular rate, Regular rhythm, No murmurs, rubs or gallops.  Abdomen:  Soft, nontender, nondistended, normal active bowel sounds.   :    Skin: Visualized skin is  Dry, No erythema, No rash.   Musculoskeletal:   No cyanosis, clubbing or edema.  Neurologic: Alert, Grossly non-focal.   Psychiatric: Normal affect, Normal mood  Lymphatic:  No cervical LAD        RADIOLOGY  OUTSIDE IMAGES-DX CHEST   Final Result      DX-CHEST-PORTABLE (1 VIEW)   Final Result      1.  Diffuse interstitial opacities.      2.  Hyperinflated lungs.      3.  Bibasilar atelectasis versus scarring.      EC-ECHOCARDIOGRAM COMPLETE W/ CONT    (Results Pending)   US-RENAL    (Results Pending)         COURSE & MEDICAL DECISION MAKING  Pertinent Labs & Imaging studies reviewed. (See chart for details)        7:53 AM - Medical record reviewed, patient seen and examined at bedside.  Patient received IV fluids for hypotension and sepsis.  This is a CMS requirement and not subject to p.o. challenge.  " Following IV fluids the patient had improvement in his hypotension.  Additional fluids contraindicated due to anemia/GI bleed, plan to administer blood products.    8:31 AM - Discussed case with Dr. Ayoub (GI) who will evaluate the patient.    8:45 AM -discussed the case with R[facility] will evaluate the patient for admission.  I have ordered Rocephin for the patient's pneumonia.    10 AM -troponin mildly elevated at 47, suspect this is due to demand ischemia from severe anemia and hypotension.  Hemoglobin of 6.4 following 2 units of PRBCs shows acceptable increase.  He will need additional transfusions as he continues to be hypotensive.        Decision Making:  This is a 69 y.o. year old male who presents as a transfer from the [facility] facility for sepsis and GI bleed.  Patient is hemodynamically unstable at this time and will require additional fluid resuscitation as well as PRBC.  He does not have any prior history of a GI bleed.  Protonix drip was continued.  I discussed case with the critical care team as well as GI.  At this time I feel he is too unstable for upper endoscopy.  Plan to continue resuscitation and hopefully undergo endoscopy this afternoon if stable.    CRITICAL CARE  The very real possibilty of a deterioration of this patient's condition required the highest level of my preparedness for sudden, emergent intervention.  I provided critical care services, which included medication orders, frequent reevaluations of the patient's condition and response to treatment, ordering and reviewing test results, and discussing the case with various consultants.  The critical care time associated with the care of the patient was 35 minutes. Review chart for interventions. This time is exclusive of any other billable procedures.     FINAL IMPRESSION  1. Upper GI bleed    2. Anemia due to blood loss, acute    3. Hypotension due to hypovolemia    4. Severe sepsis (HCC)

## 2020-02-04 NOTE — ASSESSMENT & PLAN NOTE
- symptomatic   - initial Hgb 3.6 at outside facility  - improved to 6.4 after 2u PRBCs on admission  - likely UGIB given melena  Plan:  - now Hgb at 8.5  - iron panel showed normal iron and high ferritin- likely from his acute state and from NHL  - FOBT +ve at outside hospital  - See EGD results above  -Ferrous Sulphate and Vit C  -Follow up with PCP

## 2020-02-04 NOTE — CONSULTS
Gastroenterology Consultation    Date of service: 2/4/2020    Consulting Physician: Karthik Young M.D.    History of Present Illness: Richard Chang is a 69 y.o. male with a history of non-Hodgkin's lymphoma for 20 years (sees Dr. Danielson at [facility], observation only and treatment naïve per patient), benign prostatic hypertrophy (self caths for the last 4 years), COPD, teeth removal 4 years ago with subsequent weight loss of 30 pounds due to poor appetite, left inguinal hernia repair, he was transferred from [place] for suspected GI bleed in the setting of hemodynamic instability and pneumonia.      Hospital course at [place]   Patient presented there for shortness of breath, weakness, and history of recent streptococcal pneumonia (per wife)/influenza discharged from the hospital 5 days ago per patient.  Has had black stools twice a day for the last 5 days with loose to watery consistency.  States his colonoscopy 12 years ago was normal.  Denies ever having an EGD.  Has chronic GERD, takes baking soda only for this.  Took ibuprofen 200 mg x 4 tabs 2 days ago for headache, states he was already having dark stools at this time, does not use NSAIDs or blood thinners regularly.  Denies nausea or vomiting including no hematemesis or hemoptysis.  States he has never had a bleeding problem and has never received blood transfusions in the past.  Hemoglobin dropped from 7.4 to 3.6.  Lactic acid there noted to be 9.8.  Vitals were heart rate 117, temperature 37.9 degrees, respiratory rate 26, SPO2 93% on 2 L.  Lowest blood pressure per paper chart was 76/50 (pre-transfusion). Patient was given packed red blood cells that were leuko-reduced, ?2 units.  Patient also received pantoprazole 80 mg IV push, vancomycin, diphenhydramine 25 mg, and bolus of normal saline of ?1 L.  WBC 38.1, hemoglobin 3.6, , RDW 71.5, platelets 163, PT 18.7, INR 1.6, PTT 33.2, BUN 73, creatinine 2.0, sodium 144, potassium 4.3, chloride 112,  "CO2 16, anion gap 16, calcium 8.6, protein 4.1, albumin 1.6, bilirubin 0.9, lipase 86, AST 14, ALT 21, alkaline phosphatase 41, troponin 0.02, lactic acid 9.8.  EKG reported as sinus tachycardia with premature ventricular complexes.    [facility]  Vitals at time of interview were heart rate 107, blood pressure 99/50, SPO2 97%.  Patient was given ceftriaxone, 1 L lactated Ringer's, and receiving pantoprazole drip.    Home medications per [facility] include albuterol inhaler 1 puff as needed, finasteride 5 mg daily, megestrol 10 mL daily, tamsulosin 0.4 mg daily.    Quit alcohol 30 years ago, states he was never a heavy drinker.  Quit tobacco in 1992, smoked half a pack a day for 15 years.  Patient's wife and daughter were present during the interview.      No current facility-administered medications on file prior to encounter.      Current Outpatient Medications on File Prior to Encounter   Medication Sig Dispense Refill   • tamsulosin (FLOMAX) 0.4 MG capsule Take 0.4 mg by mouth every evening.     • finasteride (PROSCAR) 5 MG Tab Take 5 mg by mouth every evening.     • ibuprofen (MOTRIN) 200 MG Tab Take 800 mg by mouth every 6 hours as needed.     • ipratropium-albuterol (DUONEB) 0.5-2.5 (3) MG/3ML nebulizer solution 3 mL by Nebulization route every four hours as needed for Shortness of Breath.     • albuterol 108 (90 Base) MCG/ACT Aero Soln inhalation aerosol Inhale 2 Puffs by mouth every 6 hours as needed for Shortness of Breath.         Social History     Tobacco Use   • Smoking status: Not on file   Substance Use Topics   • Alcohol use: Not on file   • Drug use: Not on file        No past medical history on file. See HPI.    No past surgical history on file. See HPI.    Allergies: Pcn [penicillins]    No family history on file. See HPI.    Vitals:    02/04/20 0738 02/04/20 0740 02/04/20 0809 02/04/20 0846   Height: 1.854 m (6' 1\")      Weight: 68 kg (150 lb)      Weight % change since last entry.: 0 %      BP:  (!) " 85/61 (!) 86/57 (!) 94/54   Pulse:  (!) 115 (!) 113 (!) 106   BMI (Calculated): 19.79      Resp:  18 (!) 21 (!) 22   Temp:  38 °C (100.4 °F)     TempSrc:  Temporal         Physical Examination  General: Lying in hospital bed  Neuro/Psych: Alert and oriented, answering questions appropriately  HEENT: Extraocular movements intact  CVS: Borderline tachycardic, regular rhythm, distant heart sounds  Respiratory: Did not appreciate wheezing or rhonchi, likely hyperinflated lungs bilaterally  Abdomen/: Soft, nontender, nondistended, positive bowel sounds, catheter in place with clear yellow urine in bag  Extremities: Bilateral lower extremity pitting edema  Skin: Warm and dry      Lab Results   Component Value Date/Time    PROTHROMBTM 18.4 (H) 02/04/2020 07:45 AM    INR 1.49 (H) 02/04/2020 07:45 AM      No results found for: WBC, RBC, HEMOGLOBIN, HEMATOCRIT, MCV, MCH, MCHC, MPV, NEUTSPOLYS, LYMPHOCYTES, MONOCYTES, EOSINOPHILS, BASOPHILS, HYPOCHROMIA, ANISOCYTOSIS   Lab Results   Component Value Date/Time    SODIUM 137 02/04/2020 07:45 AM    POTASSIUM 4.5 02/04/2020 07:45 AM    CHLORIDE 112 02/04/2020 07:45 AM    CO2 16 (L) 02/04/2020 07:45 AM    GLUCOSE 112 (H) 02/04/2020 07:45 AM    BUN 82 (HH) 02/04/2020 07:45 AM    CREATININE 1.82 (H) 02/04/2020 07:45 AM      Recent Labs     02/04/20  0745   ASTSGOT 22   ALTSGPT 19   TBILIRUBIN 1.3   ALKPHOSPHAT 32   GLOBULIN 1.8*   INR 1.49*       Assessment and Recommendations:  Acute blood loss anemia  Chronic macrocytic anemia  Gastroesophageal reflux disease  Acute kidney injury with unknown baseline  Hypoalbuminemia  Lactic acidosis  Non-Hodgkin's lymphoma  Benign prostatic hypertrophy with chronic outlet obstruction  COPD  Remote alcohol use  Remote tobacco use    Concern for upper GI bleed in the setting of melena, recent NSAID use, and elevated BUN to creatinine ratio.  Case complicated by hypotension/tachycardia and pneumonia at [place], improved after red blood cell  transfusion/IV fluids/antibiotics.  Patient has improved since treatment at [place] and is now normotensive without vasopressor therapy, saturating well on room air, but still with borderline tachycardia.  Significant acute drop in hemoglobin is more than expected for melena of this amount/duration, need to rule out other possible concurrent causes of anemia.    -Continue pantoprazole IV, ceftriaxone IV  -Will schedule EGD for tomorrow, tentatively 10 am, ensure patient is NPO at midnight  -Transfuse for hemoglobin <7  -Anemia workup; retic, iron, etc. already ordered, placed additional orders for B12/folate/TSH  -Will assess need for colonoscopy after EGD    Richy Crawford PGY-3, Internal Medicine  Discussed with attending Dr. Martinez, [facility]

## 2020-02-04 NOTE — PROGRESS NOTES
2 RN Skin Check    2 RN skin check complete.   Devices in place: SCDs and Nasal Cannula.  Skin assessed under devices: yes.  Confirmed pressure ulcers found on: none.   New potential pressure ulcers noted on sacrum. Wound consult placed No.  The following interventions in place Pillows and Waffle bed overlay .    All high risk skin break down areas assessed. Sacrum excoriated, red and blanching. Waffle cushion in place.  All other skin intact.

## 2020-02-04 NOTE — ASSESSMENT & PLAN NOTE
Acute related to blood loss and GI bleed  Serial monitor and transfuse may need iron supplementation

## 2020-02-04 NOTE — H&P
ICU Medicine Admitting History and Physical    Note Author: Terrie Angeles M.D.         Chief Complaint:   Shortness of breath    HPI:    Patient is a 69-year-old male with a past medical history of non-Hodgkin's lymphoma (on surveillance monitoring, never treated), COPD, BPH, h/o self-catheterization since 4 years who presented as a transfer from [facility] for evaluation and treatment of GI bleed.    Patient's wife and daughter at bedside.  Patient states that he was sick since the beginning of January, was admitted twice to [place] with shortness of breath.  Was most recently discharged 5 days ago after being treated for pneumonia and flu.  Finished the course of azithromycin and cefuroxime but reports that his shortness of breath did not improve.    Noticed black tarry stools since 5 days.  Watery, 2 episodes per day.  Denied any nausea, vomiting, abdominal pain or flank blood in stool.  Has chronic heartburn, not on PPI.  Denies any history of ulcers, aspirin use, not on blood thinners.  Took 4 ibuprofen tablets 2 days ago for a headache but no other NSAID use.  Quit alcohol many years back, not a heavy drinker in the past.  Last colonoscopy 12 years back which was normal per patient.  Never got an EGD done.    Has gradually worsening dyspnea on exertion since 5 days.  Associated with orthopnea, PND, lower extremity edema, fevers, mild hacking cough, dizziness, loss of appetite and weight loss.  Also complained of intermittent chest pain not worsening on exertion.    Outside hospital course:  He was noted to be hypotensive with SBP in the 70s.  Labs showed leukocytosis of 38, Hgb 3.6, , INR 1.6, BUN 73, Cr 2, bicarb 16, troponin 0.02, lactic acid 9.8, FOBT positive.  Chest x-ray showed bilateral pulmonary opacities.  He was given 1 L bolus, transfused 2 units PRBCs, started on vancomycin and Protonix drip and transferred to [facility].    In our ED, he got 1.5lt fluid bolus, was started on  "ceftriaxone and protonix. GI was consulted by EDP.     Review of Systems   Constitutional: Positive for fever, malaise/fatigue and weight loss. Negative for chills.   HENT: Negative for hearing loss.    Eyes: Negative for blurred vision.   Respiratory: Positive for cough and shortness of breath. Negative for sputum production.    Cardiovascular: Positive for chest pain, orthopnea, leg swelling and PND.   Gastrointestinal: Positive for heartburn and melena. Negative for abdominal pain, nausea and vomiting.   Genitourinary: Positive for frequency.   Musculoskeletal: Negative for myalgias.   Skin: Negative for rash.   Neurological: Positive for dizziness. Negative for speech change, focal weakness and headaches.   Endo/Heme/Allergies: Bruises/bleeds easily.   Psychiatric/Behavioral: Positive for depression.             Past Medical History   Non hodgkins lymphoma  COPD  BPH    Past Surgical History:  Hernia repair    Current Outpatient Medications:  Home Medications     Reviewed by Dong Bacon R.N. (Registered Nurse) on 02/04/20 at 1449  Med List Status: Complete   Medication Last Dose Status   albuterol 108 (90 Base) MCG/ACT Aero Soln inhalation aerosol 2/4/2020 Active   finasteride (PROSCAR) 5 MG Tab 2/2/2020 Active   ibuprofen (MOTRIN) 200 MG Tab 2/1/2020 Active   ipratropium-albuterol (DUONEB) 0.5-2.5 (3) MG/3ML nebulizer solution 2/4/2020 Active   tamsulosin (FLOMAX) 0.4 MG capsule 2/2/2020 Active                Medication Allergy/Sensitivities:  Allergies   Allergen Reactions   • Pcn [Penicillins] Unspecified     \"childhood\"  Tolerated ceftriaxone 2/4/2019         Family History (mandatory)   No family h/o cancer    Social History (mandatory)   Social History     Socioeconomic History   • Marital status:      Spouse name: Not on file   • Number of children: Not on file   • Years of education: Not on file   • Highest education level: Not on file   Occupational History   • Not on file   Social " Needs   • Financial resource strain: Not on file   • Food insecurity:     Worry: Never true     Inability: Never true   • Transportation needs:     Medical: Not on file     Non-medical: Not on file   Tobacco Use   • Smoking status: Former Smoker     Packs/day: 0.00   • Smokeless tobacco: Never Used   Substance and Sexual Activity   • Alcohol use: Not Currently   • Drug use: Not Currently   • Sexual activity: Not on file   Lifestyle   • Physical activity:     Days per week: Not on file     Minutes per session: Not on file   • Stress: Not on file   Relationships   • Social connections:     Talks on phone: Not on file     Gets together: Not on file     Attends Confucianism service: Not on file     Active member of club or organization: Not on file     Attends meetings of clubs or organizations: Not on file     Relationship status: Not on file   • Intimate partner violence:     Fear of current or ex partner: Not on file     Emotionally abused: Not on file     Physically abused: Not on file     Forced sexual activity: Not on file   Other Topics Concern   • Not on file   Social History Narrative   • Not on file     Living situation: Lives with wife   PCP : Pcp Pt States None    Physical Exam     Vitals:    02/04/20 1430 02/04/20 1443 02/04/20 1445 02/04/20 1500   BP: (!) 87/58  (!) 80/56 (!) 78/55   Pulse: (!) 104  (!) 101 (!) 102   Resp: (!) 28  (!) 23 (!) 32   Temp: 36.7 °C (98 °F)  36.7 °C (98 °F)    TempSrc: Temporal      SpO2: 98%  97% 98%   Weight:  68.5 kg (151 lb 0.2 oz)     Height:         Body mass index is 19.92 kg/m².  O2 therapy: Pulse Oximetry: 98 %, O2 (LPM): 2, O2 Delivery: Silicone Nasal Cannula    Physical Exam   Constitutional: He is oriented to person, place, and time.   cachectic   HENT:   Head: Normocephalic and atraumatic.   Eyes: Pupils are equal, round, and reactive to light. EOM are normal.   Neck: Normal range of motion.   Cardiovascular: Regular rhythm.   No murmur heard.  tachycardic    Pulmonary/Chest: Effort normal. He has rales.   Abdominal: Soft. He exhibits no distension. There is no tenderness.   Musculoskeletal: Normal range of motion.         General: Edema present.   Neurological: He is alert and oriented to person, place, and time.   Skin: Skin is warm.   Psychiatric: Affect and judgment normal.   Nursing note and vitals reviewed.        Data Review        Lab Data Review:  Recent Results (from the past 24 hour(s))   Lactic acid (lactate)    Collection Time: 02/04/20  7:45 AM   Result Value Ref Range    Lactic Acid 5.4 (HH) 0.5 - 2.0 mmol/L   CBC WITH DIFFERENTIAL    Collection Time: 02/04/20  7:45 AM   Result Value Ref Range    WBC 34.4 (HH) 4.8 - 10.8 K/uL    RBC 2.00 (L) 4.70 - 6.10 M/uL    Hemoglobin 6.4 (L) 14.0 - 18.0 g/dL    Hematocrit 20.6 (L) 42.0 - 52.0 %    .0 (H) 81.4 - 97.8 fL    MCH 32.0 27.0 - 33.0 pg    MCHC 31.1 (L) 33.7 - 35.3 g/dL    RDW 52.5 (H) 35.9 - 50.0 fL    Platelet Count 112 (L) 164 - 446 K/uL    MPV 10.0 9.0 - 12.9 fL    Neutrophils-Polys 27.80 (L) 44.00 - 72.00 %    Lymphocytes 70.40 (H) 22.00 - 41.00 %    Monocytes 0.90 0.00 - 13.40 %    Eosinophils 0.00 0.00 - 6.90 %    Basophils 0.00 0.00 - 1.80 %    Nucleated RBC 0.20 /100 WBC    Neutrophils (Absolute) 9.87 (H) 1.82 - 7.42 K/uL    Lymphs (Absolute) 24.22 (H) 1.00 - 4.80 K/uL    Monos (Absolute) 0.31 0.00 - 0.85 K/uL    Eos (Absolute) 0.00 0.00 - 0.51 K/uL    Baso (Absolute) 0.00 0.00 - 0.12 K/uL    NRBC (Absolute) 0.07 K/uL   COMP METABOLIC PANEL    Collection Time: 02/04/20  7:45 AM   Result Value Ref Range    Sodium 137 135 - 145 mmol/L    Potassium 4.5 3.6 - 5.5 mmol/L    Chloride 112 96 - 112 mmol/L    Co2 16 (L) 20 - 33 mmol/L    Anion Gap 9.0 0.0 - 11.9    Glucose 112 (H) 65 - 99 mg/dL    Bun 82 (HH) 8 - 22 mg/dL    Creatinine 1.82 (H) 0.50 - 1.40 mg/dL    Calcium 8.5 8.5 - 10.5 mg/dL    AST(SGOT) 22 12 - 45 U/L    ALT(SGPT) 19 2 - 50 U/L    Alkaline Phosphatase 32 30 - 99 U/L    Total  Bilirubin 1.3 0.1 - 1.5 mg/dL    Albumin 1.9 (L) 3.2 - 4.9 g/dL    Total Protein 3.7 (L) 6.0 - 8.2 g/dL    Globulin 1.8 (L) 1.9 - 3.5 g/dL    A-G Ratio 1.1 g/dL   Prothrombin Time    Collection Time: 02/04/20  7:45 AM   Result Value Ref Range    PT 18.4 (H) 12.0 - 14.6 sec    INR 1.49 (H) 0.87 - 1.13   ESTIMATED GFR    Collection Time: 02/04/20  7:45 AM   Result Value Ref Range    GFR If African American 45 (A) >60 mL/min/1.73 m 2    GFR If Non  37 (A) >60 mL/min/1.73 m 2   COD - Adult (Type and Screen)    Collection Time: 02/04/20  7:45 AM   Result Value Ref Range    ABO Grouping Only B     Rh Grouping Only POS (A)     Antibody Screen-Cod NEG     Component R       RI                  RedBloodCellsIRR    [id number]   issued       02/04/20   13:22      Product Type Red Blood Cells IRR LR     Dispense Status issued     Unit Number (Barcoded) [id number]     Product Code (Barcoded) V9003B31     Blood Type (Barcoded) 5100    DIFFERENTIAL MANUAL    Collection Time: 02/04/20  7:45 AM   Result Value Ref Range    Bands-Stabs 0.90 0.00 - 10.00 %    Manual Diff Status PERFORMED    PERIPHERAL SMEAR REVIEW    Collection Time: 02/04/20  7:45 AM   Result Value Ref Range    Peripheral Smear Review see below    PLATELET ESTIMATE    Collection Time: 02/04/20  7:45 AM   Result Value Ref Range    Plt Estimation Decreased    URINALYSIS    Collection Time: 02/04/20  7:55 AM   Result Value Ref Range    Color Yellow     Character Clear     Specific Gravity 1.015 <1.035    Ph 5.0 5.0 - 8.0    Glucose Negative Negative mg/dL    Ketones Negative Negative mg/dL    Protein Negative Negative mg/dL    Bilirubin Negative Negative    Urobilinogen, Urine 0.2 Negative    Nitrite Negative Negative    Leukocyte Esterase Trace (A) Negative    Occult Blood Negative Negative    Micro Urine Req Microscopic    URINE MICROSCOPIC (W/UA)    Collection Time: 02/04/20  7:55 AM   Result Value Ref Range    WBC 5-10 (A) /hpf    RBC 0-2 (A)  /hpf    Bacteria Negative None /hpf    Epithelial Cells Negative /hpf    Hyaline Cast 0-2 /lpf   Lactic acid (lactate): Repeat if initial lactic acid result is greater than 2    Collection Time: 02/04/20  9:55 AM   Result Value Ref Range    Lactic Acid 3.4 (H) 0.5 - 2.0 mmol/L   Procalcitonin    Collection Time: 02/04/20  9:55 AM   Result Value Ref Range    Procalcitonin 1.08 (H) <0.25 ng/mL   Troponin    Collection Time: 02/04/20  9:55 AM   Result Value Ref Range    Troponin T 47 (H) 6 - 19 ng/L   GRAM STAIN    Collection Time: 02/04/20 10:20 AM   Result Value Ref Range    Significant Indicator .     Source RESP     Site SPUTUM     Gram Stain Result       Sputum Gram stain quality score is <1, probable  oropharyngeal contamination. Culture not performed.  Recollect if clinically indicated.     Influenza A/B By PCR (Adult - Flu Only)    Collection Time: 02/04/20 10:36 AM   Result Value Ref Range    Influenza virus A RNA Negative Negative    Influenza virus B, PCR Negative Negative   ABO Rh Confirm    Collection Time: 02/04/20 12:00 PM   Result Value Ref Range    ABO Rh Confirm B POS        Imaging/Procedures Review:      US-RENAL   Final Result         1.  Mildly increased echogenicity of the kidneys suggestive of medical renal disease.      2.  Small lower pole left renal cyst.      3.  Marked splenomegaly with complex cystic mass measuring 5.8 cm in diameter. Recommend CT scan of the abdomen with contrast for further evaluation      4.  Trace amount of free fluid in the pelvic cul-de-sac.      OUTSIDE IMAGES-DX CHEST   Final Result      DX-CHEST-PORTABLE (1 VIEW)   Final Result      1.  Diffuse interstitial opacities.      2.  Hyperinflated lungs.      3.  Bibasilar atelectasis versus scarring.      EC-ECHOCARDIOGRAM COMPLETE W/ CONT    (Results Pending)             Assessment/Plan     Gastrointestinal hemorrhage with melena- (present on admission)  Assessment & Plan  - black stools since 5 days  - unclear  etiology- ?PUD  - not alcoholic, no ASA/ chronic NSAID use  - symptomatic anemia with sob, LE edema  - Hgb at OSH at 3.6, got 2u PRBCs  - Improved to 6.4 on admission here  Plan:  - monitor on telemetry with continuous pulse oximetry  - NPO  - 2 large bore IVs  - got 2.5lts fluid bolus, continue maintenance IVF   - type and screen  - 1U irradiated PRBCs for Hgb of 6.4  - will give small dose of lasix after transfusion to prevent fluid overload  - f/u H&H q6hrs, transfuse for Hb<7  - Pantop 40 IV BID  - DDAVP x1 for uremic platelets  - GI on board- will follow recs        Sepsis (HCC)- (present on admission)  Assessment & Plan  This is sepsis  - SIRS 3/4 for tachycardia, tachypnea, leukocytosis  - elevated pro calcitonin at 1.08  - likely source is lungs- community acquired pneumonia  - CXR showed diffuse interstitial opacities  Plan:  - IVF per sepsis protocol  - continue maintenance LR  - Ceftriaxone and doxycycline for CAP  - f/u blood, sputum and urine cx  - will deescalate abx based on cx results      EUFEMIA (acute kidney injury) (HCC)- (present on admission)  Assessment & Plan  - per wife, patient has CKD but not sure of baseline creatinine  - ?acute on chronic kidney disease  - likely hypovolemic  - Maintenance IVF with LR  - f/u CPK levels  - renal U/S ordered    Anemia- (present on admission)  Assessment & Plan  - symptomatic   - initial Hgb 3.6 at outside facility  - improved to 6.4 after 2u PRBCs on admission  - likely UGIB given melena  Plan:  - Transfuse 1U irradiated PRBCs given h/o NHL  - f/u H&H q6hrs, transfuse for Hgb <7  - watch for fluid overload, small dose of lasix post transfusion if needed  - initiated work up for anemia- f/u iron panel, ferritin, RC, haptoglobin and LDH levels  - FOBT +ve at outside hospital  - GI on board- will follow recs      Leukocytosis  Assessment & Plan  - likely from sepsis vs ?NHL  - IVF, antibiotics for CAP  - f/u blood, sputum and urine cx  - f/u LDH and uric acid  levels    Peripheral edema- (present on admission)  Assessment & Plan  - likely from acute blood loss anemia  - also has orthopnea, PND and CXR showed b/l interstitial opacities  - f/u echocardiogram    Malnutrition (HCC)- (present on admission)  Assessment & Plan  - severe protein calorie malnutrition  - low albumin levels  - f/u pre albumin levels  - nutrition consult placed for weight loss     Metabolic acidosis- (present on admission)  Assessment & Plan  - likely secondary to lactic acidosis from hemorrhagic shock from acute anemia  Plan:  - continue maintenance IVF  - f/u repeat LA levels     Non Hodgkin's lymphoma (HCC)- (present on admission)  Assessment & Plan  - h/o NHL since 20 years  - only on surveillance, was never treated  - f[facility]  Plan:  - given anemia, leukocytosis, weight loss- ordered LDH and uric acid  - will ctm    COPD (chronic obstructive pulmonary disease) (HCC)- (present on admission)  Assessment & Plan  - h/o COPD on inhalers at home  - not in acute exacerbation  - RT and 02 per protcol           Quality Measures  Quality-Core Measures   Reviewed items::  EKG reviewed, Labs reviewed, Medications reviewed and Radiology images reviewed  Lowe catheter::  No Lowe  DVT prophylaxis pharmacological::  Contraindicated - Anemia requiring blood transfusion  DVT prophylaxis - mechanical:  SCDs  Ulcer Prophylaxis::  Yes  Antibiotics:  Treating active infection/contamination beyond 24 hours perioperative coverage    PCP: Pcp Pt States None

## 2020-02-04 NOTE — ASSESSMENT & PLAN NOTE
- black stools since 5 days PTA  - unclear etiology- ?PUD  - not alcoholic, no ASA/ chronic NSAID use  - symptomatic anemia with sob, LE edema  - Hgb at OSH at 3.6, got 2u PRBCs  - Improved to 6.4 on admission here    Plan:  - monitor on telemetry with continuous pulse oximetry  - 2 large bore IVs  - got 2.5lts fluid bolus, continue maintenance IVF   - type and screen  - currently Hgb at 8.5  - Pantop 40 IV BID  - DDAVP x1 for uremic platelets  - GI  EGD on  2/5/20:Blood loss anemia secondary to resolved upper GI bleed tertiary to duodenal ulcer. Suspect NSAID use as cause of ulcer. Rule out Helicobacter pylori, biopsies taken and sent to pathology  - Avoid Nsaids  -Omeprazole 40 BID for a month followed by 40mg daily for a month   -Magnesium CL supplementation   -follow up with pcp

## 2020-02-05 ENCOUNTER — ANESTHESIA (OUTPATIENT)
Dept: SURGERY | Facility: MEDICAL CENTER | Age: 70
DRG: 871 | End: 2020-02-05
Payer: COMMERCIAL

## 2020-02-05 ENCOUNTER — ANESTHESIA EVENT (OUTPATIENT)
Dept: SURGERY | Facility: MEDICAL CENTER | Age: 70
DRG: 871 | End: 2020-02-05
Payer: COMMERCIAL

## 2020-02-05 PROBLEM — R16.1 SPLENIC MASS: Status: ACTIVE | Noted: 2020-02-05

## 2020-02-05 PROBLEM — D62 ACUTE BLOOD LOSS ANEMIA: Status: ACTIVE | Noted: 2020-02-04

## 2020-02-05 PROBLEM — R91.1 PULMONARY NODULE: Status: ACTIVE | Noted: 2020-02-05

## 2020-02-05 LAB
ALBUMIN SERPL BCP-MCNC: 1.9 G/DL (ref 3.2–4.9)
ALBUMIN/GLOB SERPL: 1.1 G/DL
ALP SERPL-CCNC: 29 U/L (ref 30–99)
ALT SERPL-CCNC: 19 U/L (ref 2–50)
ANION GAP SERPL CALC-SCNC: 5 MMOL/L (ref 0–11.9)
ANISOCYTOSIS BLD QL SMEAR: ABNORMAL
AST SERPL-CCNC: 18 U/L (ref 12–45)
BASOPHILS # BLD AUTO: 0 % (ref 0–1.8)
BASOPHILS # BLD: 0 K/UL (ref 0–0.12)
BILIRUB SERPL-MCNC: 1.5 MG/DL (ref 0.1–1.5)
BUN SERPL-MCNC: 70 MG/DL (ref 8–22)
BURR CELLS BLD QL SMEAR: NORMAL
CALCIUM SERPL-MCNC: 8.3 MG/DL (ref 8.5–10.5)
CHLORIDE SERPL-SCNC: 109 MMOL/L (ref 96–112)
CO2 SERPL-SCNC: 22 MMOL/L (ref 20–33)
CREAT SERPL-MCNC: 1.74 MG/DL (ref 0.5–1.4)
EKG IMPRESSION: NORMAL
EOSINOPHIL # BLD AUTO: 0.24 K/UL (ref 0–0.51)
EOSINOPHIL NFR BLD: 0.9 % (ref 0–6.9)
ERYTHROCYTE [DISTWIDTH] IN BLOOD BY AUTOMATED COUNT: 65.8 FL (ref 35.9–50)
FOLATE SERPL-MCNC: 8.4 NG/ML
GLOBULIN SER CALC-MCNC: 1.7 G/DL (ref 1.9–3.5)
GLUCOSE SERPL-MCNC: 107 MG/DL (ref 65–99)
HCT VFR BLD AUTO: 21.4 % (ref 42–52)
HCT VFR BLD AUTO: 21.9 % (ref 42–52)
HCT VFR BLD AUTO: 22.9 % (ref 42–52)
HCT VFR BLD AUTO: 23.2 % (ref 42–52)
HGB BLD-MCNC: 6.7 G/DL (ref 14–18)
HGB BLD-MCNC: 6.9 G/DL (ref 14–18)
HGB BLD-MCNC: 7.1 G/DL (ref 14–18)
HGB BLD-MCNC: 7.3 G/DL (ref 14–18)
HGB RETIC QN AUTO: 36.2 PG/CELL (ref 29–35)
HGB RETIC QN AUTO: 36.6 PG/CELL (ref 29–35)
HYPOCHROMIA BLD QL SMEAR: ABNORMAL
IMM RETICS NFR: 40 % (ref 9.3–17.4)
IMM RETICS NFR: 44.9 % (ref 9.3–17.4)
LYMPHOCYTES # BLD AUTO: 17.84 K/UL (ref 1–4.8)
LYMPHOCYTES NFR BLD: 68.1 % (ref 22–41)
MANUAL DIFF BLD: NORMAL
MCH RBC QN AUTO: 31.3 PG (ref 27–33)
MCHC RBC AUTO-ENTMCNC: 31 G/DL (ref 33.7–35.3)
MCV RBC AUTO: 100.9 FL (ref 81.4–97.8)
MICROCYTES BLD QL SMEAR: ABNORMAL
MONOCYTES # BLD AUTO: 0 K/UL (ref 0–0.85)
MONOCYTES NFR BLD AUTO: 0 % (ref 0–13.4)
MORPHOLOGY BLD-IMP: NORMAL
NEUTROPHILS # BLD AUTO: 8.12 K/UL (ref 1.82–7.42)
NEUTROPHILS NFR BLD: 31 % (ref 44–72)
NRBC # BLD AUTO: 0.05 K/UL
NRBC BLD-RTO: 0.2 /100 WBC
OVALOCYTES BLD QL SMEAR: NORMAL
PATHOLOGY CONSULT NOTE: NORMAL
PLATELET # BLD AUTO: 98 K/UL (ref 164–446)
PLATELET BLD QL SMEAR: NORMAL
PMV BLD AUTO: 10.6 FL (ref 9–12.9)
POIKILOCYTOSIS BLD QL SMEAR: NORMAL
POLYCHROMASIA BLD QL SMEAR: NORMAL
POTASSIUM SERPL-SCNC: 3.9 MMOL/L (ref 3.6–5.5)
PREALB SERPL-MCNC: 6 MG/DL (ref 18–38)
PROT SERPL-MCNC: 3.6 G/DL (ref 6–8.2)
RBC # BLD AUTO: 2.27 M/UL (ref 4.7–6.1)
RBC BLD AUTO: PRESENT
RETICS # AUTO: 0.15 M/UL (ref 0.04–0.06)
RETICS # AUTO: 0.16 M/UL (ref 0.04–0.06)
RETICS/RBC NFR: 6.4 % (ref 0.8–2.1)
RETICS/RBC NFR: 7.1 % (ref 0.8–2.1)
SMUDGE CELLS BLD QL SMEAR: NORMAL
SODIUM SERPL-SCNC: 136 MMOL/L (ref 135–145)
TSH SERPL DL<=0.005 MIU/L-ACNC: 3.43 UIU/ML (ref 0.38–5.33)
VIT B12 SERPL-MCNC: 380 PG/ML (ref 211–911)
WBC # BLD AUTO: 26.2 K/UL (ref 4.8–10.8)

## 2020-02-05 PROCEDURE — 160203 HCHG ENDO MINUTES - 1ST 30 MINS LEVEL 4: Performed by: INTERNAL MEDICINE

## 2020-02-05 PROCEDURE — 700105 HCHG RX REV CODE 258: Performed by: ANESTHESIOLOGY

## 2020-02-05 PROCEDURE — 86923 COMPATIBILITY TEST ELECTRIC: CPT

## 2020-02-05 PROCEDURE — 700105 HCHG RX REV CODE 258

## 2020-02-05 PROCEDURE — 700111 HCHG RX REV CODE 636 W/ 250 OVERRIDE (IP): Performed by: ANESTHESIOLOGY

## 2020-02-05 PROCEDURE — 84443 ASSAY THYROID STIM HORMONE: CPT

## 2020-02-05 PROCEDURE — 82746 ASSAY OF FOLIC ACID SERUM: CPT

## 2020-02-05 PROCEDURE — 770020 HCHG ROOM/CARE - TELE (206)

## 2020-02-05 PROCEDURE — 84134 ASSAY OF PREALBUMIN: CPT

## 2020-02-05 PROCEDURE — 85007 BL SMEAR W/DIFF WBC COUNT: CPT

## 2020-02-05 PROCEDURE — 99291 CRITICAL CARE FIRST HOUR: CPT | Performed by: INTERNAL MEDICINE

## 2020-02-05 PROCEDURE — 700105 HCHG RX REV CODE 258: Performed by: STUDENT IN AN ORGANIZED HEALTH CARE EDUCATION/TRAINING PROGRAM

## 2020-02-05 PROCEDURE — 700111 HCHG RX REV CODE 636 W/ 250 OVERRIDE (IP): Performed by: STUDENT IN AN ORGANIZED HEALTH CARE EDUCATION/TRAINING PROGRAM

## 2020-02-05 PROCEDURE — 700102 HCHG RX REV CODE 250 W/ 637 OVERRIDE(OP): Performed by: INTERNAL MEDICINE

## 2020-02-05 PROCEDURE — 160009 HCHG ANES TIME/MIN: Performed by: INTERNAL MEDICINE

## 2020-02-05 PROCEDURE — 85027 COMPLETE CBC AUTOMATED: CPT

## 2020-02-05 PROCEDURE — P9016 RBC LEUKOCYTES REDUCED: HCPCS

## 2020-02-05 PROCEDURE — 80053 COMPREHEN METABOLIC PANEL: CPT

## 2020-02-05 PROCEDURE — 88305 TISSUE EXAM BY PATHOLOGIST: CPT

## 2020-02-05 PROCEDURE — A9270 NON-COVERED ITEM OR SERVICE: HCPCS | Performed by: INTERNAL MEDICINE

## 2020-02-05 PROCEDURE — 700102 HCHG RX REV CODE 250 W/ 637 OVERRIDE(OP): Performed by: STUDENT IN AN ORGANIZED HEALTH CARE EDUCATION/TRAINING PROGRAM

## 2020-02-05 PROCEDURE — 82607 VITAMIN B-12: CPT

## 2020-02-05 PROCEDURE — 93010 ELECTROCARDIOGRAM REPORT: CPT | Performed by: INTERNAL MEDICINE

## 2020-02-05 PROCEDURE — 85046 RETICYTE/HGB CONCENTRATE: CPT

## 2020-02-05 PROCEDURE — 86945 BLOOD PRODUCT/IRRADIATION: CPT

## 2020-02-05 PROCEDURE — 88312 SPECIAL STAINS GROUP 1: CPT

## 2020-02-05 PROCEDURE — 85014 HEMATOCRIT: CPT

## 2020-02-05 PROCEDURE — 93005 ELECTROCARDIOGRAM TRACING: CPT | Performed by: INTERNAL MEDICINE

## 2020-02-05 PROCEDURE — 160048 HCHG OR STATISTICAL LEVEL 1-5: Performed by: INTERNAL MEDICINE

## 2020-02-05 PROCEDURE — 85018 HEMOGLOBIN: CPT

## 2020-02-05 PROCEDURE — 160035 HCHG PACU - 1ST 60 MINS PHASE I: Performed by: INTERNAL MEDICINE

## 2020-02-05 PROCEDURE — 0DB78ZX EXCISION OF STOMACH, PYLORUS, VIA NATURAL OR ARTIFICIAL OPENING ENDOSCOPIC, DIAGNOSTIC: ICD-10-PCS | Performed by: INTERNAL MEDICINE

## 2020-02-05 PROCEDURE — 500066 HCHG BITE BLOCK, ECT: Performed by: INTERNAL MEDICINE

## 2020-02-05 PROCEDURE — 36430 TRANSFUSION BLD/BLD COMPNT: CPT

## 2020-02-05 PROCEDURE — C9113 INJ PANTOPRAZOLE SODIUM, VIA: HCPCS | Performed by: STUDENT IN AN ORGANIZED HEALTH CARE EDUCATION/TRAINING PROGRAM

## 2020-02-05 PROCEDURE — 160002 HCHG RECOVERY MINUTES (STAT): Performed by: INTERNAL MEDICINE

## 2020-02-05 PROCEDURE — A9270 NON-COVERED ITEM OR SERVICE: HCPCS | Performed by: STUDENT IN AN ORGANIZED HEALTH CARE EDUCATION/TRAINING PROGRAM

## 2020-02-05 RX ORDER — ONDANSETRON 2 MG/ML
4 INJECTION INTRAMUSCULAR; INTRAVENOUS
Status: DISCONTINUED | OUTPATIENT
Start: 2020-02-05 | End: 2020-02-05 | Stop reason: HOSPADM

## 2020-02-05 RX ORDER — SODIUM CHLORIDE, SODIUM LACTATE, POTASSIUM CHLORIDE, CALCIUM CHLORIDE 600; 310; 30; 20 MG/100ML; MG/100ML; MG/100ML; MG/100ML
INJECTION, SOLUTION INTRAVENOUS
Status: DISCONTINUED | OUTPATIENT
Start: 2020-02-05 | End: 2020-02-05 | Stop reason: SURG

## 2020-02-05 RX ORDER — SODIUM CHLORIDE 9 MG/ML
INJECTION, SOLUTION INTRAVENOUS
Status: COMPLETED
Start: 2020-02-05 | End: 2020-02-05

## 2020-02-05 RX ORDER — DIPHENHYDRAMINE HYDROCHLORIDE 50 MG/ML
12.5 INJECTION INTRAMUSCULAR; INTRAVENOUS
Status: DISCONTINUED | OUTPATIENT
Start: 2020-02-05 | End: 2020-02-05 | Stop reason: HOSPADM

## 2020-02-05 RX ORDER — SODIUM CHLORIDE, SODIUM LACTATE, POTASSIUM CHLORIDE, CALCIUM CHLORIDE 600; 310; 30; 20 MG/100ML; MG/100ML; MG/100ML; MG/100ML
INJECTION, SOLUTION INTRAVENOUS CONTINUOUS
Status: DISCONTINUED | OUTPATIENT
Start: 2020-02-05 | End: 2020-02-05 | Stop reason: HOSPADM

## 2020-02-05 RX ORDER — HALOPERIDOL 5 MG/ML
1 INJECTION INTRAMUSCULAR
Status: DISCONTINUED | OUTPATIENT
Start: 2020-02-05 | End: 2020-02-05 | Stop reason: HOSPADM

## 2020-02-05 RX ORDER — FUROSEMIDE 10 MG/ML
10 INJECTION INTRAMUSCULAR; INTRAVENOUS ONCE
Status: COMPLETED | OUTPATIENT
Start: 2020-02-05 | End: 2020-02-05

## 2020-02-05 RX ADMIN — GLYCOPYRROLATE 1 CAPSULE: 15.6 CAPSULE RESPIRATORY (INHALATION) at 17:20

## 2020-02-05 RX ADMIN — PANTOPRAZOLE SODIUM 40 MG: 40 INJECTION, POWDER, LYOPHILIZED, FOR SOLUTION INTRAVENOUS at 17:20

## 2020-02-05 RX ADMIN — FUROSEMIDE 10 MG: 10 INJECTION, SOLUTION INTRAVENOUS at 05:32

## 2020-02-05 RX ADMIN — PROPOFOL 50 MG: 10 INJECTION, EMULSION INTRAVENOUS at 10:43

## 2020-02-05 RX ADMIN — DOXYCYCLINE 100 MG: 100 TABLET, FILM COATED ORAL at 04:56

## 2020-02-05 RX ADMIN — FINASTERIDE 5 MG: 5 TABLET, FILM COATED ORAL at 17:16

## 2020-02-05 RX ADMIN — CEFTRIAXONE SODIUM 1 G: 1 INJECTION, POWDER, FOR SOLUTION INTRAMUSCULAR; INTRAVENOUS at 04:57

## 2020-02-05 RX ADMIN — SODIUM CHLORIDE, POTASSIUM CHLORIDE, SODIUM LACTATE AND CALCIUM CHLORIDE: 600; 310; 30; 20 INJECTION, SOLUTION INTRAVENOUS at 10:25

## 2020-02-05 RX ADMIN — PANTOPRAZOLE SODIUM 40 MG: 40 INJECTION, POWDER, LYOPHILIZED, FOR SOLUTION INTRAVENOUS at 04:57

## 2020-02-05 RX ADMIN — SODIUM CHLORIDE 500 ML: 9 INJECTION, SOLUTION INTRAVENOUS at 05:04

## 2020-02-05 RX ADMIN — PROPOFOL 50 MG: 10 INJECTION, EMULSION INTRAVENOUS at 10:41

## 2020-02-05 RX ADMIN — SODIUM CHLORIDE, POTASSIUM CHLORIDE, SODIUM LACTATE AND CALCIUM CHLORIDE: 600; 310; 30; 20 INJECTION, SOLUTION INTRAVENOUS at 06:16

## 2020-02-05 ASSESSMENT — COGNITIVE AND FUNCTIONAL STATUS - GENERAL
MOBILITY SCORE: 18
SUGGESTED CMS G CODE MODIFIER DAILY ACTIVITY: CI
MOVING FROM LYING ON BACK TO SITTING ON SIDE OF FLAT BED: A LITTLE
TOILETING: A LITTLE
STANDING UP FROM CHAIR USING ARMS: A LITTLE
SUGGESTED CMS G CODE MODIFIER MOBILITY: CK
DAILY ACTIVITIY SCORE: 23
WALKING IN HOSPITAL ROOM: A LITTLE
MOVING TO AND FROM BED TO CHAIR: A LITTLE
TURNING FROM BACK TO SIDE WHILE IN FLAT BAD: A LITTLE
CLIMB 3 TO 5 STEPS WITH RAILING: A LITTLE

## 2020-02-05 ASSESSMENT — ENCOUNTER SYMPTOMS
ABDOMINAL PAIN: 0
HEADACHES: 0
ORTHOPNEA: 1
SHORTNESS OF BREATH: 1
FEVER: 0
DEPRESSION: 0
BRUISES/BLEEDS EASILY: 1
NAUSEA: 0
MYALGIAS: 0
HEARTBURN: 1
SORE THROAT: 0
CHILLS: 0
BLOOD IN STOOL: 1
DIARRHEA: 0
BLURRED VISION: 0
COUGH: 0
PND: 0
DIZZINESS: 0
WEIGHT LOSS: 1
VOMITING: 0
SHORTNESS OF BREATH: 0

## 2020-02-05 ASSESSMENT — PAIN SCALES - GENERAL: PAIN_LEVEL: 0

## 2020-02-05 NOTE — PROCEDURES
DATE OF SERVICE:  02/05/2020    PROCEDURE PERFORMED:  Esophagogastroduodenoscopy with biopsy of the stomach.      PHYSICIAN:  Barry Ayoub MD    PRESENT MEDICATION:  Deep sedation with propofol.      CONSENT:  Procedure risks and benefits reviewed thoroughly with the patient,   risks including but not limited to bleeding, perforation, side effects of   medication were informed.  Patient voiced understanding and agreed to proceed.        PREPROCEDURE DIAGNOSIS:  Melena.    POSTPROCEDURE DIAGNOSES:    1.  Clean-based 2 cm duodenal ulcer in the duodenal bulb.    2.  Atrophic antrum, status post biopsy to rule out Helicobacter pylori.      DESCRIPTION OF PROCEDURE:  A forward viewing gastroscope was passed carefully   and easily under direct visualization into the esophagus after the patient was   sedated and a bite block was inserted in the mouth.  The esophagus was   normal, proximal, middle, distal GE junction was regular.  Scope was advanced   into the stomach.  There was no blood.  Retroflex view of stomach were normal.    Forward view of stomach were normal and upon evaluation through the pyloric   valve, immediately a 2 cm clean-based ulcer without stigmata for high risk   bleed that being the overlying clot, visible vessel or active bleeding was   appreciated.  Duodenal sweep was angulated, but navigated without difficulty.    The second and third portions of the duodenum were otherwise normal.  Scope   was retracted.  There was mild antral atrophy and was biopsied to rule out   Helicobacter pylori.  Stomach was suctioned, desufflated, and the scope was   removed.      COMPLICATIONS:  None.      BLOOD LOSS:  None.      SPECIMENS:  Obtained.      RECOMMENDATIONS:  Patient's GI bleed is secondary to duodenal ulcer that in   the healing phases.  Please continue proton pump inhibitor therapy in b.i.d.   fashion.  The etiology is likely the NSAID-induced duodenopathy.  NSAID   abstinence is imperative.  Patient  may be started on a regular diet.  All   these orders were present in the EMR.  From a GI perspective, patient may be   discharged to home today.  Please call with any further questions or   discussions.       ____________________________________     MD BK Delarosa / JULIA    DD:  02/05/2020 11:08:31  DT:  02/05/2020 11:43:12    D#:  [id number]  Job#:  [id number]

## 2020-02-05 NOTE — ANESTHESIA TIME REPORT
Anesthesia Start and Stop Event Times     Date Time Event    2/5/2020 1018 Ready for Procedure     1025 Anesthesia Start     1104 Anesthesia Stop        Responsible Staff  02/05/20    Name Role Begin End    Eldon Rodriguez M.D. Anesth 1025 1104        Preop Diagnosis (Free Text):  Pre-op Diagnosis     melena,acute on chronic anemia        Preop Diagnosis (Codes):    Post op Diagnosis  Acute GI bleeding      Premium Reason  Non-Premium    Comments:

## 2020-02-05 NOTE — ANESTHESIA PREPROCEDURE EVALUATION
Relevant Problems   PULMONARY   (+) COPD (chronic obstructive pulmonary disease) ([facility])         (+) EUFEMIA (acute kidney injury) ([facility])       Physical Exam    Airway   Mallampati: II  TM distance: >3 FB  Neck ROM: full       Cardiovascular - normal exam  Rhythm: regular  Rate: normal  (-) murmur     Dental - normal exam         Pulmonary - normal exam  Breath sounds clear to auscultation     Abdominal    Neurological - normal exam                 Anesthesia Plan    ASA 3   ASA physical status 3 criteria: COPD    Plan - general             Induction: intravenous    Postoperative Plan: Postoperative administration of opioids is intended.    Pertinent diagnostic labs and testing reviewed    Informed Consent:    Anesthetic plan and risks discussed with patient.    Use of blood products discussed with: patient whom consented to blood products.

## 2020-02-05 NOTE — PROGRESS NOTES
"Critical Care Progress Note    Date of admission  2/4/2020    Chief Complaint  69 y.o. male admitted 2/4/2020 with GIB    Hospital Course    \"69 y.o. male who presented 2/4/2020 with Hx of NHL, COPD with prior tobacco and quit 10 yrs ago, BPH and urinary difficulty and needs self craterization.Patient was recent hospitalized 2x for COPD, PNA/flu in [facility]. He has had fever shortness of breath, pnd, increase leg swelling and 7 days of black or dark stools. No prior GI bleed or liver disease dose bruise easily. A couple dose of nsaids after this started. Describes dizziness with standing went to [facility] today found a hg of 3.6 BUN of 73 and creatine of 2.0 and SBP was in the 70's given 2 units prbc and 2l of fluids and transferred here for further evaluation. On presentation here he had hr of 115 sbp of 85 on 0.5l n/c. He only complains of difficulty breathing which is improved post blood transfusion. He has had significant blood loss. Bedside echo with hyperdynamic biV function and IVC completely kissing with increase work of breathing with bilateral b-lines 6-8 inferiorly. \"      Interval Problem Update  Reviewed last 24 hour events:  Neuro: AOx4  HR: 80s  SBP: 80-100s  Tmax: AF  GI: NPO for EGD, black BM last night  I/O: wells, 1100 out overnight. LR @125  Lines: PIV x3, wells  Resp: RA   Vte: Contraindicated  PPI/H2:IV BID  Antibx: d/c CTX and doxy    CBC pending for WBC  Complex mass, need abdominal CT    Review of Systems  Review of Systems   Constitutional: Negative for chills and fever.   HENT: Negative for congestion and sore throat.    Eyes: Negative for blurred vision.   Respiratory: Negative for cough and shortness of breath.    Cardiovascular: Negative for chest pain and leg swelling.   Gastrointestinal: Positive for blood in stool. Negative for abdominal pain, diarrhea, nausea and vomiting.   Genitourinary: Negative for dysuria and frequency.   Skin: Negative for rash.   Neurological: Negative " for dizziness and headaches.        Vital Signs for last 24 hours   Temp:  [36.2 °C (97.1 °F)-36.8 °C (98.3 °F)] 36.2 °C (97.1 °F)  Pulse:  [] 80  Resp:  [18-32] 19  BP: ()/(50-64) 96/60  SpO2:  [83 %-99 %] 97 %    Hemodynamic parameters for last 24 hours       Respiratory Information for the last 24 hours       Physical Exam   Physical Exam  Constitutional:       General: He is not in acute distress.     Appearance: Normal appearance.      Comments: cachetic   HENT:      Head: Normocephalic and atraumatic.      Mouth/Throat:      Mouth: Mucous membranes are moist.   Eyes:      Pupils: Pupils are equal, round, and reactive to light.   Neck:      Musculoskeletal: No neck rigidity.   Cardiovascular:      Rate and Rhythm: Normal rate and regular rhythm.      Pulses: Normal pulses.      Heart sounds: Normal heart sounds.   Pulmonary:      Effort: Pulmonary effort is normal.      Breath sounds: Normal breath sounds.   Abdominal:      General: Abdomen is flat. Bowel sounds are normal.      Palpations: Abdomen is soft.   Musculoskeletal:      Right lower leg: No edema.      Left lower leg: No edema.   Skin:     General: Skin is warm and dry.   Neurological:      General: No focal deficit present.      Mental Status: He is alert and oriented to person, place, and time.   Psychiatric:         Mood and Affect: Mood normal.         Medications  Current Facility-Administered Medications   Medication Dose Route Frequency Provider Last Rate Last Dose   • Respiratory Care per Protocol   Nebulization Continuous RT Terrie Angeles M.D.       • pantoprazole (PROTONIX) injection 40 mg  40 mg Intravenous BID Terrie Angeles M.D.   40 mg at 02/05/20 0457   • finasteride (PROSCAR) tablet 5 mg  5 mg Oral Q EVENING Terrie Angeles M.D.   5 mg at 02/04/20 1732   • albuterol inhaler 2 Puff  2 Puff Inhalation Q6HRS PRN Terrie Angeles M.D.   2 Puff at 02/04/20 2020       Fluids    Intake/Output Summary (Last 24 hours) at 2/5/2020  0903  Last data filed at 2/5/2020 0800  Gross per 24 hour   Intake 5082.09 ml   Output 2200 ml   Net 2882.09 ml       Laboratory      Recent Labs     02/04/20  1730   CPKTOTAL 22     Recent Labs     02/04/20  0745 02/05/20  0445   SODIUM 137 136   POTASSIUM 4.5 3.9   CHLORIDE 112 109   CO2 16* 22   BUN 82* 70*   CREATININE 1.82* 1.74*   CALCIUM 8.5 8.3*     Recent Labs     02/04/20  0745 02/05/20  0445   ALTSGPT 19 19   ASTSGOT 22 18   ALKPHOSPHAT 32 29*   TBILIRUBIN 1.3 1.5   PREALBUMIN  --  6.0*   GLUCOSE 112* 107*     Recent Labs     02/04/20  0745 02/05/20  0445   WBC 34.4*  --    NEUTSPOLYS 27.80*  --    LYMPHOCYTES 70.40*  --    MONOCYTES 0.90  --    EOSINOPHILS 0.00  --    BASOPHILS 0.00  --    ASTSGOT 22 18   ALTSGPT 19 19   ALKPHOSPHAT 32 29*   TBILIRUBIN 1.3 1.5     Recent Labs     02/04/20  0745 02/04/20 1730 02/04/20  2350 02/05/20  0730   RBC 2.00*  --   --   --    HEMOGLOBIN 6.4* 7.2* 6.7* 7.3*   HEMATOCRIT 20.6* 23.6* 21.9* 23.2*   PLATELETCT 112*  --   --   --    PROTHROMBTM 18.4*  --   --   --    INR 1.49*  --   --   --    IRON  --  164  --   --    FERRITIN  --  >1500.0*  --   --    TOTIRONBC  --  182*  --   --        Imaging  none new    Assessment/Plan  Gastrointestinal hemorrhage with melena- (present on admission)  Assessment & Plan  Hemodynamics improved  EGD today  PPI gtt  Transfuse <7  q6h hgb for now      Sepsis (HCC)- (present on admission)  Assessment & Plan  Initially started on antibiotics given shock and high WBC  No focal symptoms suggestive of infection, now fever  WBC pending for today  D/c antibiotics given largely improved clinical condition        EUFEMIA (acute kidney injury) (HCC)- (present on admission)  Assessment & Plan  Improving with resuscitation      Acute blood loss anemia- (present on admission)  Assessment & Plan  Acute related to blood loss and GI bleed  Serial monitor and transfuse may need iron supplementation       Splenic mass  Assessment & Plan  See on ultrasound.   Should have CT imaging f/u once GIB has resolved    Pulmonary nodule  Assessment & Plan  Present/unchanged appearing since 2011    Peripheral edema- (present on admission)  Assessment & Plan  Normal echo      Malnutrition (HCC)- (present on admission)  Assessment & Plan  Significant weight loss  Nutrition consult    Metabolic acidosis- (present on admission)  Assessment & Plan  Improved    Non Hodgkin's lymphoma (HCC)- (present on admission)  Assessment & Plan  [facility]  If WBC still elevated, may need further eval    COPD (chronic obstructive pulmonary disease) (HCC)- (present on admission)  Assessment & Plan  Spiriva  Albuterol prn         VTE:  Contraindicated  Ulcer: PPI  Lines: None d/c wells today    I have performed a physical exam and reviewed and updated ROS and Plan today (2/5/2020). In review of yesterday's note (2/4/2020), there are no changes except as documented above.     Discussed patient condition and risk of morbidity and/or mortality with RN, RT, Pharmacy, Dietary, [employer] [facility] resident, Charge nurse / hot rounds, Patient and GI  The patient remains critically ill with severe acute blood loss anemia and GIB.  Critical care time = 32 minutes in directly providing and coordinating critical care and extensive data review.  No time overlap and excludes procedures.

## 2020-02-05 NOTE — ANESTHESIA POSTPROCEDURE EVALUATION
Patient: Richard Chang    Procedure Summary     Date:  02/05/20 Room / Location:  [facility] 03 / SURGERY [facility]    Anesthesia Start:  1025 Anesthesia Stop:  1104    Procedure:  GASTROSCOPY, WITH BIOPSY (N/A ) Diagnosis:  (duodenal ulcer)    Surgeon:  Barry Ayoub M.D. Responsible Provider:  Eldon Rodriguez M.D.    Anesthesia Type:  general ASA Status:  3          Final Anesthesia Type: general  Last vitals  BP   Blood Pressure : (!) 90/50    Temp   36.1 °C (97 °F)    Pulse   Pulse: 77   Resp   18    SpO2   94 %      Anesthesia Post Evaluation    Patient location during evaluation: PACU  Patient participation: complete - patient participated  Level of consciousness: awake and alert  Pain score: 0    Airway patency: patent  Anesthetic complications: no  Cardiovascular status: hemodynamically stable  Respiratory status: acceptable  Hydration status: euvolemic    PONV: none           Nurse Pain Score: 0 (NPRS)

## 2020-02-05 NOTE — PROGRESS NOTES
"Gastroenterology Progress Note    Date & Time note created:    2/5/2020   6:25 AM     Patient ID:  Name:             Richard Chang    YOB: 1950  Age:                 69 y.o.  male  MRN:               [id number]                                                               Chief Complaint(s):      Dark stools    History of Present Illness:    This is a very pleasant 69 y.o. male with the past medical history as listed below who presented to [facility] for dark stools and was found to be severely anemic with hemodynamic instability and acute kidney injury, received 2 units pRBC at [facility], was transferred to [facility] for a higher level of care. Also being treated for pneumonia.     At [facility] patient received 1 unit PRBC yesterday and 1 unit today.  Remains afebrile with improvement of tachycardia and tachypnea however still low normal blood pressures.  No significant change in renal function.  New labs show elevation of uric acid, LDH, lactic acid, and severe elevation of ferritin.  Echocardiogram overall normal.  Patient states he feels \"a lot better than yesterday.\"  He is n.p.o. for EGD today.    Review of Systems:      Constitutional: Denies fevers, weight changes  Eyes: Denies changes in vision, jaundice  Ears/Nose/Throat/Mouth: Denies nasal congestion or sore throat   Cardiovascular: Denies chest pain or palpitations   Respiratory: Denies shortness of breath, denies cough  Gastrointestinal/Hepatic: Denies abdominal pain, nausea, vomiting, constipation  Genitourinary: Denies dysuria or frequency  Musculoskeletal/Rheum: Denies  joint pain and swelling, edema  Skin: Denies rash  Neurological: Denies headache, confusion, memory loss or focal weakness/parasthesias  Psychiatric: denies mood disorder   Endocrine: [place] thyroid problems  All other systems were reviewed and are negative (AMA/CMS criteria)              Past Medical History:   No past medical history on file.  Active Hospital Problems    " Diagnosis   • Gastrointestinal hemorrhage with melena [K92.1]     Priority: High   • Anemia [D64.9]     Priority: Medium   • EUFEMIA (acute kidney injury) (HCC) [N17.9]     Priority: Medium   • Sepsis (HCC) [A41.9]     Priority: Medium   • COPD (chronic obstructive pulmonary disease) (HCC) [J44.9]     Priority: Low   • Non Hodgkin's lymphoma (HCC) [C85.90]     Priority: Low   • Metabolic acidosis [E87.2]     Priority: Low   • Malnutrition (HCC) [E46]     Priority: Low   • Peripheral edema [R60.9]     Priority: Low   • Leukocytosis [D72.829]     Priority: Low   • Lactic acidosis [E87.2]     Priority: Low       Past Surgical History:  No past surgical history on file.    Hospital Medications:  Current Facility-Administered Medications   Medication Dose Frequency Provider Last Rate Last Dose   • Respiratory Care per Protocol   Continuous RT Terrie Angeles M.D.       • lactated ringers infusion   Continuous Terrie Angeles M.D. 125 mL/hr at 02/05/20 0616     • pantoprazole (PROTONIX) injection 40 mg  40 mg BID Terrie Angeles M.D.   40 mg at 02/05/20 0457   • cefTRIAXone (ROCEPHIN) 1 g in  mL IVPB  1 g Q24HRS Terrie Angeles M.D.   Stopped at 02/05/20 0527   • doxycycline monohydrate (ADOXA) tablet 100 mg  100 mg Q12HRS Terrie Angeles M.D.   100 mg at 02/05/20 0456   • finasteride (PROSCAR) tablet 5 mg  5 mg Q EVENING Terrie Angeles M.D.   5 mg at 02/04/20 1732   • albuterol inhaler 2 Puff  2 Puff Q6HRS PRN Terrie Angeles M.D.   2 Puff at 02/04/20 2020   Last reviewed on 2/4/2020  2:49 PM by Dong Bacon R.N.      Current Outpatient Medications:  Medications Prior to Admission   Medication Sig Dispense Refill Last Dose   • tamsulosin (FLOMAX) 0.4 MG capsule Take 0.4 mg by mouth every evening.   2/2/2020 at PM   • finasteride (PROSCAR) 5 MG Tab Take 5 mg by mouth every evening.   2/2/2020 at PM   • ibuprofen (MOTRIN) 200 MG Tab Take 800 mg by mouth every 6 hours as needed.   2/1/2020 at PRN   • ipratropium-albuterol  "(DUONEB) 0.5-2.5 (3) MG/3ML nebulizer solution 3 mL by Nebulization route every four hours as needed for Shortness of Breath.   2/4/2020 at 0300   • albuterol 108 (90 Base) MCG/ACT Aero Soln inhalation aerosol Inhale 2 Puffs by mouth every 6 hours as needed for Shortness of Breath.   2/4/2020 at 0300       Medication Allergy:  Allergies   Allergen Reactions   • Pcn [Penicillins] Unspecified     \"childhood\"  Tolerated ceftriaxone 2/4/2019       Physical Exam:  Weight/BMI: Body mass index is 19.92 kg/m².  BP (!) 85/57   Pulse 86   Temp 36.2 °C (97.1 °F)   Resp 20   Ht 1.854 m (6' 1\")   Wt 68.5 kg (151 lb 0.2 oz)   SpO2 99%   Vitals:    02/04/20 2100 02/05/20 0448 02/05/20 0500 02/05/20 0600   BP: 101/58 (!) 91/53 (!) 91/53 (!) 85/57   Pulse: 96 89 89 86   Resp: (!) 25 (!) 22 (!) 22 20   Temp:  36.2 °C (97.1 °F)     TempSrc:       SpO2: (!) 83% 97% 96% 99%   Weight:       Height:         Oxygen Therapy:  Pulse Oximetry: 99 %, O2 (LPM): 2, O2 Delivery: Silicone Nasal Cannula    Intake/Output Summary (Last 24 hours) at 2/5/2020 0625  Last data filed at 2/5/2020 0600  Gross per 24 hour   Intake 4932.09 ml   Output 1900 ml   Net 3032.09 ml       General: Lying in hospital bed, no acute distrress  Neuro/Psych: Alert and oriented, answering questions appropriately  HEENT: Extraocular movements intact  CVS: Borderline tachycardic, regular rhythm, distant heart sounds  Respiratory: Did not appreciate wheezing or rhonchi, likely hyperinflated lungs bilaterally  Abdomen/: Soft, nontender, nondistended, positive bowel sounds  Extremities: Bilateral lower extremity pitting edema  Skin: Warm and dry    MDM (Data Review):     Per [facility] records:  CMP on December 6, 2019 all within normal limits except glucose 117 and cholesterol 112, including normal renal function of BUN 19 and creatinine 1.0  CBC on December 6, 2019 showing WBC 20.68, hemoglobin 14.7, hematocrit 45.4, MCV 94.4, RDW 13.9, platelets 167, segmented " neutrophils 20%, lymphocytes 76%  Fecal occult blood negative on June 5, 2019  CBC on May 10, 2019 showing WBC 32.57, hemoglobin 14.6, hematocrit 45.1, MCV 95.8, RDW 14.6, platelets 172, segmented neutrophils 21%, lymphocytes 78%   on May 4, 2016  Uric acid 4.8 on May 4, 2016  Iron studies dated September 7, 2005 include iron 71, percent saturation 26, , trans-ferritin 194, no ferritin performed    Lab Data Review:  Recent Results (from the past 24 hour(s))   Lactic acid (lactate)    Collection Time: 02/04/20  7:45 AM   Result Value Ref Range    Lactic Acid 5.4 (HH) 0.5 - 2.0 mmol/L   CBC WITH DIFFERENTIAL    Collection Time: 02/04/20  7:45 AM   Result Value Ref Range    WBC 34.4 (HH) 4.8 - 10.8 K/uL    RBC 2.00 (L) 4.70 - 6.10 M/uL    Hemoglobin 6.4 (L) 14.0 - 18.0 g/dL    Hematocrit 20.6 (L) 42.0 - 52.0 %    .0 (H) 81.4 - 97.8 fL    MCH 32.0 27.0 - 33.0 pg    MCHC 31.1 (L) 33.7 - 35.3 g/dL    RDW 52.5 (H) 35.9 - 50.0 fL    Platelet Count 112 (L) 164 - 446 K/uL    MPV 10.0 9.0 - 12.9 fL    Neutrophils-Polys 27.80 (L) 44.00 - 72.00 %    Lymphocytes 70.40 (H) 22.00 - 41.00 %    Monocytes 0.90 0.00 - 13.40 %    Eosinophils 0.00 0.00 - 6.90 %    Basophils 0.00 0.00 - 1.80 %    Nucleated RBC 0.20 /100 WBC    Neutrophils (Absolute) 9.87 (H) 1.82 - 7.42 K/uL    Lymphs (Absolute) 24.22 (H) 1.00 - 4.80 K/uL    Monos (Absolute) 0.31 0.00 - 0.85 K/uL    Eos (Absolute) 0.00 0.00 - 0.51 K/uL    Baso (Absolute) 0.00 0.00 - 0.12 K/uL    NRBC (Absolute) 0.07 K/uL   COMP METABOLIC PANEL    Collection Time: 02/04/20  7:45 AM   Result Value Ref Range    Sodium 137 135 - 145 mmol/L    Potassium 4.5 3.6 - 5.5 mmol/L    Chloride 112 96 - 112 mmol/L    Co2 16 (L) 20 - 33 mmol/L    Anion Gap 9.0 0.0 - 11.9    Glucose 112 (H) 65 - 99 mg/dL    Bun 82 (HH) 8 - 22 mg/dL    Creatinine 1.82 (H) 0.50 - 1.40 mg/dL    Calcium 8.5 8.5 - 10.5 mg/dL    AST(SGOT) 22 12 - 45 U/L    ALT(SGPT) 19 2 - 50 U/L    Alkaline Phosphatase 32  30 - 99 U/L    Total Bilirubin 1.3 0.1 - 1.5 mg/dL    Albumin 1.9 (L) 3.2 - 4.9 g/dL    Total Protein 3.7 (L) 6.0 - 8.2 g/dL    Globulin 1.8 (L) 1.9 - 3.5 g/dL    A-G Ratio 1.1 g/dL   Prothrombin Time    Collection Time: 02/04/20  7:45 AM   Result Value Ref Range    PT 18.4 (H) 12.0 - 14.6 sec    INR 1.49 (H) 0.87 - 1.13   ESTIMATED GFR    Collection Time: 02/04/20  7:45 AM   Result Value Ref Range    GFR If African American 45 (A) >60 mL/min/1.73 m 2    GFR If Non  37 (A) >60 mL/min/1.73 m 2   COD - Adult (Type and Screen)    Collection Time: 02/04/20  7:45 AM   Result Value Ref Range    ABO Grouping Only B     Rh Grouping Only POS (A)     Antibody Screen-Cod NEG     Component R       RI                  RedBloodCellsIRR    [id number]   transfused   02/04/20   13:22      Product Type Red Blood Cells IRR LR     Dispense Status transfused     Unit Number (Barcoded) [id number]     Product Code (Barcoded) V6783L62     Blood Type (Barcoded) 5100     Component R       RI                  RedBloodCellsIRR    [id number]   issued       02/05/20   04:37      Product Type Red Blood Cells IRR LR     Dispense Status issued     Unit Number (Barcoded) [id number]     Product Code (Barcoded) X6795I83     Blood Type (Barcoded) 5100    DIFFERENTIAL MANUAL    Collection Time: 02/04/20  7:45 AM   Result Value Ref Range    Bands-Stabs 0.90 0.00 - 10.00 %    Manual Diff Status PERFORMED    PERIPHERAL SMEAR REVIEW    Collection Time: 02/04/20  7:45 AM   Result Value Ref Range    Peripheral Smear Review see below    PLATELET ESTIMATE    Collection Time: 02/04/20  7:45 AM   Result Value Ref Range    Plt Estimation Decreased    URINALYSIS    Collection Time: 02/04/20  7:55 AM   Result Value Ref Range    Color Yellow     Character Clear     Specific Gravity 1.015 <1.035    Ph 5.0 5.0 - 8.0    Glucose Negative Negative mg/dL    Ketones Negative Negative mg/dL    Protein Negative Negative mg/dL    Bilirubin Negative  Negative    Urobilinogen, Urine 0.2 Negative    Nitrite Negative Negative    Leukocyte Esterase Trace (A) Negative    Occult Blood Negative Negative    Micro Urine Req Microscopic    URINE MICROSCOPIC (W/UA)    Collection Time: 02/04/20  7:55 AM   Result Value Ref Range    WBC 5-10 (A) /hpf    RBC 0-2 (A) /hpf    Bacteria Negative None /hpf    Epithelial Cells Negative /hpf    Hyaline Cast 0-2 /lpf   Lactic acid (lactate): Repeat if initial lactic acid result is greater than 2    Collection Time: 02/04/20  9:55 AM   Result Value Ref Range    Lactic Acid 3.4 (H) 0.5 - 2.0 mmol/L   Procalcitonin    Collection Time: 02/04/20  9:55 AM   Result Value Ref Range    Procalcitonin 1.08 (H) <0.25 ng/mL   Troponin    Collection Time: 02/04/20  9:55 AM   Result Value Ref Range    Troponin T 47 (H) 6 - 19 ng/L   GRAM STAIN    Collection Time: 02/04/20 10:20 AM   Result Value Ref Range    Significant Indicator .     Source RESP     Site SPUTUM     Gram Stain Result       Sputum Gram stain quality score is <1, probable  oropharyngeal contamination. Culture not performed.  Recollect if clinically indicated.     Influenza A/B By PCR (Adult - Flu Only)    Collection Time: 02/04/20 10:36 AM   Result Value Ref Range    Influenza virus A RNA Negative Negative    Influenza virus B, PCR Negative Negative   ABO Rh Confirm    Collection Time: 02/04/20 12:00 PM   Result Value Ref Range    ABO Rh Confirm B POS    LDH    Collection Time: 02/04/20  5:30 PM   Result Value Ref Range    LDH Total 356 (H) 107 - 266 U/L   URIC ACID    Collection Time: 02/04/20  5:30 PM   Result Value Ref Range    Uric Acid 11.8 (H) 2.5 - 8.3 mg/dL   IRON/TOTAL IRON BIND    Collection Time: 02/04/20  5:30 PM   Result Value Ref Range    Iron 164 50 - 180 ug/dL    Total Iron Binding 182 (L) 250 - 450 ug/dL    % Saturation 90 (H) 15 - 55 %   FERRITIN    Collection Time: 02/04/20  5:30 PM   Result Value Ref Range    Ferritin >1500.0 (H) 22.0 - 322.0 ng/mL   CREATINE  KINASE    Collection Time: 02/04/20  5:30 PM   Result Value Ref Range    CPK Total 22 0 - 154 U/L   Lactic Acid Every four hours after STAT order    Collection Time: 02/04/20  5:30 PM   Result Value Ref Range    Lactic Acid 3.8 (H) 0.5 - 2.0 mmol/L   HEMOGLOBIN AND HEMATOCRIT    Collection Time: 02/04/20  5:30 PM   Result Value Ref Range    Hemoglobin 7.2 (L) 14.0 - 18.0 g/dL    Hematocrit 23.6 (L) 42.0 - 52.0 %   EC-ECHOCARDIOGRAM COMPLETE W/O CONT    Collection Time: 02/04/20  8:41 PM   Result Value Ref Range    Eject.Frac. MOD BP 66.67     Eject.Frac. MOD 4C 65.78     Eject.Frac. MOD 2C 67.46     Left Ventrical Ejection Fraction 55    Lactic Acid Every four hours after STAT order    Collection Time: 02/04/20 10:10 PM   Result Value Ref Range    Lactic Acid 2.1 (H) 0.5 - 2.0 mmol/L   HEMOGLOBIN AND HEMATOCRIT    Collection Time: 02/04/20 11:50 PM   Result Value Ref Range    Hemoglobin 6.7 (L) 14.0 - 18.0 g/dL    Hematocrit 21.9 (L) 42.0 - 52.0 %   Comp Metabolic Panel (CMP)    Collection Time: 02/05/20  4:45 AM   Result Value Ref Range    Sodium 136 135 - 145 mmol/L    Potassium 3.9 3.6 - 5.5 mmol/L    Chloride 109 96 - 112 mmol/L    Co2 22 20 - 33 mmol/L    Anion Gap 5.0 0.0 - 11.9    Glucose 107 (H) 65 - 99 mg/dL    Bun 70 (H) 8 - 22 mg/dL    Creatinine 1.74 (H) 0.50 - 1.40 mg/dL    Calcium 8.3 (L) 8.5 - 10.5 mg/dL    AST(SGOT) 18 12 - 45 U/L    ALT(SGPT) 19 2 - 50 U/L    Alkaline Phosphatase 29 (L) 30 - 99 U/L    Total Bilirubin 1.5 0.1 - 1.5 mg/dL    Albumin 1.9 (L) 3.2 - 4.9 g/dL    Total Protein 3.6 (L) 6.0 - 8.2 g/dL    Globulin 1.7 (L) 1.9 - 3.5 g/dL    A-G Ratio 1.1 g/dL   PREALBUMIN    Collection Time: 02/05/20  4:45 AM   Result Value Ref Range    Pre-Albumin 6.0 (L) 18.0 - 38.0 mg/dL   VITAMIN B12    Collection Time: 02/05/20  4:45 AM   Result Value Ref Range    Vitamin B12 -True Cobalamin 380 211 - 911 pg/mL   FOLATE    Collection Time: 02/05/20  4:45 AM   Result Value Ref Range    Folate -Folic Acid  8.4 >4.0 ng/mL   TSH WITH REFLEX TO FT4    Collection Time: 02/05/20  4:45 AM   Result Value Ref Range    TSH 3.430 0.380 - 5.330 uIU/mL   ESTIMATED GFR    Collection Time: 02/05/20  4:45 AM   Result Value Ref Range    GFR If  47 (A) >60 mL/min/1.73 m 2    GFR If Non  39 (A) >60 mL/min/1.73 m 2       MDM (Assessment and Plan):     Active Hospital Problems    Diagnosis   • Gastrointestinal hemorrhage with melena [K92.1]     Priority: High   • Anemia [D64.9]     Priority: Medium   • EUFEMIA (acute kidney injury) (HCC) [N17.9]     Priority: Medium   • Sepsis (HCC) [A41.9]     Priority: Medium   • COPD (chronic obstructive pulmonary disease) (HCC) [J44.9]     Priority: Low   • Non Hodgkin's lymphoma (HCC) [C85.90]     Priority: Low   • Metabolic acidosis [E87.2]     Priority: Low   • Malnutrition (HCC) [E46]     Priority: Low   • Peripheral edema [R60.9]     Priority: Low   • Leukocytosis [D72.829]     Priority: Low   • Lactic acidosis [E87.2]     Priority: Low       Imaging/Procedures Review:    EC-ECHOCARDIOGRAM COMPLETE W/O CONT   Final Result      US-RENAL   Final Result         1.  Mildly increased echogenicity of the kidneys suggestive of medical renal disease.      2.  Small lower pole left renal cyst.      3.  Marked splenomegaly with complex cystic mass measuring 5.8 cm in diameter. Recommend CT scan of the abdomen with contrast for further evaluation      4.  Trace amount of free fluid in the pelvic cul-de-sac.      OUTSIDE IMAGES-DX CHEST   Final Result      DX-CHEST-PORTABLE (1 VIEW)   Final Result      1.  Diffuse interstitial opacities.      2.  Hyperinflated lungs.      3.  Bibasilar atelectasis versus scarring.          Assessment  Chronic lymphocytic leukemia/small lymphocytic non-Hodgkin's lymphoma   Bi[facility]-pro[facility]/28/2002 [facility]   Stage [facility] per [facility] Olmstead's note dated July 17, 2019   No treatment to date due to indolent disease   Patient following with  "oncology yearly  Acute macrocytic anemia  Colonic diverticulosis   Per [facility] problem list  Gastroesophageal reflux disease  Acute kidney injury without chronic kidney disease  History of skin cancer   Squamous cell carcinoma status post excision  Hypoalbuminemia  Lactic acidosis  Chronic obstructive pulmonary disease  Remote tobacco use  Chronic low back pain  Benign prostatic hypertrophy with chronic outlet obstruction  Remote alcohol use    Rule out upper GI bleeding as etiology for acute anemia.  [facility] with normal MCV and RDW - see \"MDM (Data Review)\" section above for complete list of [facility] labs.  Other causes of anemia, in the setting of patient's other chronic medical conditions, being worked up by primary team.    Recommendations  -Keep patient n.p.o. for EGD today  -Continue pantoprazole IV, ceftriaxone IV  -Trend hemoglobin and transfuse per primary team  -Work-up for other causes of anemia per primary team    Richy Crawford PGY-3, Internal Medicine  "

## 2020-02-05 NOTE — PROGRESS NOTES
[facility] Progress Note      Admit Date: 2/4/2020    Resident(s): Terrie Angeles M.D.  Attending: RENEE DUPONT/ Dr. Dempsey    Date & Time:   2/5/2020   10:31 AM       Patient ID:    Name:             Richard Chang     YOB: 1950  Age:                 69 y.o.  male   MRN:               [id number]    HPI:  Patient is a 69-year-old male with a past medical history of non-Hodgkin's lymphoma (on surveillance monitoring, never treated), COPD, BPH,  h/o  self-catheterization since 4 years who presented as a transfer from [facility] for evaluation and treatment of GI bleed. Presented with melena since 5 days. Has chronic heartburn, not on PPI.  Denies any history of ulcers, aspirin use, not on blood thinners.  Took 4 ibuprofen tablets 2 days ago for a headache but no other NSAID use.  Quit alcohol many years back, not a heavy drinker in the past.  Last colonoscopy 12 years back which was normal per patient.  Never got an EGD done.    Consultants:  GI: Dr. Ayoub  PMA: Dr. Dempsey    Interval Events:  Not tachycardic anymore  SBP 80s-90s   Patient feels much better compared to yesterday with significant improvement in his sob  Did have black stools last night  Hb 6.4-->6.7 after 1 U PRBCs last night, another unit transfused this am  Repeat Hb was 7.3  LA trended down from 5.4-->2.1  Renal u/s done for EUFEMIA showed marked splenomegaly with complex cystic mass- will consider CT abdomen once EUFEMIA resolves  Echo showed an EF of 55% with normal regional wall motion  GI on board- plan for EGD today    Review of Systems   Constitutional: Positive for malaise/fatigue and weight loss. Negative for chills and fever.   HENT: Negative for hearing loss.    Eyes: Negative for blurred vision.   Respiratory: Positive for shortness of breath.    Cardiovascular: Positive for orthopnea and leg swelling. Negative for chest pain and PND.   Gastrointestinal: Positive for heartburn and melena. Negative for abdominal pain and vomiting.  "  Genitourinary: Negative for dysuria.   Musculoskeletal: Negative for myalgias.   Skin: Negative for rash.   Neurological: Negative for dizziness.   Endo/Heme/Allergies: Bruises/bleeds easily.   Psychiatric/Behavioral: Negative for depression.       PHYSICAL EXAM  Vitals:    02/05/20 0700 02/05/20 0800 02/05/20 0900 02/05/20 0926   BP: (!) 97/54 (!) 96/60 (!) 88/53 (!) 98/59   Pulse: 82 80 86 90   Resp: 18 19 (!) 27 18   Temp:  36.2 °C (97.1 °F)  36.5 °C (97.7 °F)   TempSrc:  Temporal  Temporal   SpO2: 98% 97% 92% 92%   Weight:       Height:         Body mass index is 19.92 kg/m².  BP (!) 98/59   Pulse 90   Temp 36.5 °C (97.7 °F) (Temporal)   Resp 18   Ht 1.854 m (6' 1\")   Wt 68.5 kg (151 lb 0.2 oz)   SpO2 92%   BMI 19.92 kg/m²   O2 therapy: Pulse Oximetry: 92 %, O2 (LPM): 2, O2 Delivery: Nasal Cannula    Physical Exam   Constitutional: He is oriented to person, place, and time.   cachectic   HENT:   Head: Normocephalic and atraumatic.   Eyes: Pupils are equal, round, and reactive to light. EOM are normal.   Neck: Normal range of motion.   Cardiovascular: Normal rate and regular rhythm.   No murmur heard.  Pulmonary/Chest: Effort normal.   Abdominal: Soft. He exhibits no distension.   splenomegaly   Musculoskeletal:         General: Edema present.   Neurological: He is alert and oriented to person, place, and time.   Skin: Skin is warm.   Psychiatric: Affect and judgment normal.       Respiratory:     Respiration: 18, Pulse Oximetry: 92 %    Chest Tube Drains:          HemoDynamics:  Pulse: 90 Blood Pressure : (!) 98/59      Neuro:      Fluids:  Date 02/05/20 0700 - 02/06/20 0659   Shift 7627-1691 3142-8860 9415-6042 24 Hour Total   INTAKE   I.V. 250   250     Volume (mL) (lactated ringers infusion) 250   250   Shift Total 250   250   OUTPUT   Urine 300   300     Output (mL) (Urethral Catheter) 300   300   Shift Total 300   300   NET -50   -50        Intake/Output Summary (Last 24 hours) at 2/5/2020 " 0725  Last data filed at 2020 0600  Gross per 24 hour   Intake 4932.09 ml   Output 1900 ml   Net 3032.09 ml       Weight: 68.5 kg (151 lb 0.2 oz)  Body mass index is 19.92 kg/m².    Recent Labs     20  0745 20  0445   SODIUM 137 136   POTASSIUM 4.5 3.9   CHLORIDE 112 109   CO2 16* 22   BUN 82* 70*   CREATININE 1.82* 1.74*   CALCIUM 8.5 8.3*       GI/Nutrition:  Recent Labs     20  0745 20  0445   ALTSGPT 19 19   ASTSGOT 22 18   ALKPHOSPHAT 32 29*   TBILIRUBIN 1.3 1.5   PREALBUMIN  --  6.0*   GLUCOSE 112* 107*       Heme:  Recent Labs     20  0745 20  1730 20  2350 20  0730   RBC 2.00*  --   --   --    HEMOGLOBIN 6.4* 7.2* 6.7* 7.3*   HEMATOCRIT 20.6* 23.6* 21.9* 23.2*   PLATELETCT 112*  --   --   --    PROTHROMBTM 18.4*  --   --   --    INR 1.49*  --   --   --    IRON  --  164  --   --    FERRITIN  --  >1500.0*  --   --    TOTIRONBC  --  182*  --   --        Infectious Disease:  Temp  Av.6 °C (97.8 °F)  Min: 36.2 °C (97.1 °F)  Max: 36.8 °C (98.3 °F)  Recent Labs     2045 205   WBC 34.4*  --    NEUTSPOLYS 27.80*  --    LYMPHOCYTES 70.40*  --    MONOCYTES 0.90  --    EOSINOPHILS 0.00  --    BASOPHILS 0.00  --    ASTSGOT 22 18   ALTSGPT 19 19   ALKPHOSPHAT 32 29*   TBILIRUBIN 1.3 1.5       Meds:  • [MAR Hold] Respiratory Care per Protocol       • [MAR Hold] pantoprazole  40 mg     • [MAR Hold] finasteride  5 mg     • [MAR Hold] albuterol  2 Puff          Procedures:  Pending EGD today    Imaging:  EC-ECHOCARDIOGRAM COMPLETE W/O CONT   Final Result      US-RENAL   Final Result         1.  Mildly increased echogenicity of the kidneys suggestive of medical renal disease.      2.  Small lower pole left renal cyst.      3.  Marked splenomegaly with complex cystic mass measuring 5.8 cm in diameter. Recommend CT scan of the abdomen with contrast for further evaluation      4.  Trace amount of free fluid in the pelvic cul-de-sac.      OUTSIDE  IMAGES-DX CHEST   Final Result      DX-CHEST-PORTABLE (1 VIEW)   Final Result      1.  Diffuse interstitial opacities.      2.  Hyperinflated lungs.      3.  Bibasilar atelectasis versus scarring.          Assessment and Plan:      Gastrointestinal hemorrhage with melena- (present on admission)  Assessment & Plan  - black stools since 5 days PTA  - unclear etiology- ?PUD  - not alcoholic, no ASA/ chronic NSAID use  - symptomatic anemia with sob, LE edema  - Hgb at OSH at 3.6, got 2u PRBCs  - Improved to 6.4 on admission here  Plan:  - monitor on telemetry with continuous pulse oximetry  - NPO  - 2 large bore IVs  - got 2.5lts fluid bolus, continue maintenance IVF   - type and screen  - got 2u PRBCs since admission  - currently Hgb at 7.3  - f/u H&H q6hrs, transfuse for Hb<7  - Pantop 40 IV BID  - DDAVP x1 for uremic platelets  - GI on board- plan for EGD today        Sepsis (HCC)- (present on admission)  Assessment & Plan  - reoslved  - SIRS 3/4 on admission for tachycardia, tachypnea, leukocytosis  - elevated pro calcitonin at 1.08  - CXR showed diffuse interstitial opacities  Plan:  - blood cx negative  - leukocytosis likely reactive from underlying NHL and acute GI bleed  - discontinued abx  - f/u  sputum and urine cx        EUFEMIA (acute kidney injury) (HCC)- (present on admission)  Assessment & Plan  - improving  - likely hypovolemic  - renal U/S showed mildly increased echogenicity of the kidneys suggestive of medical renal disease and a small lower pole left renal cyst  - ctm    Anemia- (present on admission)  Assessment & Plan  - symptomatic   - initial Hgb 3.6 at outside facility  - improved to 6.4 after 2u PRBCs on admission  - likely UGIB given melena  Plan:  - got 2u since admission, now Hgb at 7.3  - f/u H&H q6hrs, transfuse for Hgb <7  - watch for fluid overload, small dose of lasix post transfusion if needed  - iron panel showed normal iron and high ferritin- likely from his acute state and from NHL  -  FOBT +ve at outside hospital  - GI on board- plan for EGD today      Leukocytosis- (present on admission)  Assessment & Plan  - likely reactive from GI bleed vs NHL  - blood cx -ve and patient improving  - discontinued abx    Peripheral edema- (present on admission)  Assessment & Plan  - likely from acute blood loss anemia  - also has orthopnea, PND and CXR showed b/l interstitial opacities  - echo showed an EF of 55% with normal regional wall motion    Malnutrition (HCC)- (present on admission)  Assessment & Plan  - severe protein calorie malnutrition  - low albumin levels  - f/u pre albumin levels  - nutrition consult placed for weight loss     Metabolic acidosis- (present on admission)  Assessment & Plan  - improving  - likely secondary to lactic acidosis from hemorrhagic shock from acute anemia      Non Hodgkin's lymphoma (HCC)- (present on admission)  Assessment & Plan  - h/o NHL since 20 years  - only on surveillance, was never treated  - f/u with [facility]son [facility]  Plan:  - given anemia, leukocytosis, weight loss- ordered LDH and uric acid which are elevated  - Renal u/s showed marked splenomegaly with complex cystic mass measuring 5.8 cm in diameter.  - Will consider CT abdomen once EUFEMIA resolves  - to be f/u by heam/onc as an outpatient    COPD (chronic obstructive pulmonary disease) (HCC)- (present on admission)  Assessment & Plan  - h/o COPD on inhalers at home  - not in acute exacerbation  - RT and 02 per protcol      DISPO: ICU    CODE STATUS: FULL    Quality Measures:  Lowe Catheter: Yes  DVT Prophylaxis: SCDs given GI bleed  Ulcer Prophylaxis: Pantoprazole  Antibiotics: None  Lines: PIV    This note was created using voice recognition software. There may be unintended errors in spelling, grammar or content.

## 2020-02-06 PROBLEM — D69.1 THROMBOCYTOPATHIA (HCC): Status: ACTIVE | Noted: 2020-02-06

## 2020-02-06 PROBLEM — E88.09 HYPOALBUMINEMIA: Status: ACTIVE | Noted: 2020-02-06

## 2020-02-06 LAB
ANISOCYTOSIS BLD QL SMEAR: ABNORMAL
BASOPHILS # BLD AUTO: 0 % (ref 0–1.8)
BASOPHILS # BLD: 0 K/UL (ref 0–0.12)
BURR CELLS BLD QL SMEAR: NORMAL
EOSINOPHIL # BLD AUTO: 0.42 K/UL (ref 0–0.51)
EOSINOPHIL NFR BLD: 1.7 % (ref 0–6.9)
ERYTHROCYTE [DISTWIDTH] IN BLOOD BY AUTOMATED COUNT: 55.3 FL (ref 35.9–50)
HAPTOGLOB SERPL-MCNC: 145 MG/DL (ref 30–200)
HCT VFR BLD AUTO: 28 % (ref 42–52)
HGB BLD-MCNC: 9.2 G/DL (ref 14–18)
HGB RETIC QN AUTO: 36.7 PG/CELL (ref 29–35)
IMM RETICS NFR: 39.9 % (ref 9.3–17.4)
LACTATE BLD-SCNC: 2.3 MMOL/L (ref 0.5–2)
LYMPHOCYTES # BLD AUTO: 18.18 K/UL (ref 1–4.8)
LYMPHOCYTES NFR BLD: 73.9 % (ref 22–41)
MACROCYTES BLD QL SMEAR: ABNORMAL
MANUAL DIFF BLD: NORMAL
MCH RBC QN AUTO: 30.9 PG (ref 27–33)
MCHC RBC AUTO-ENTMCNC: 32.1 G/DL (ref 33.7–35.3)
MCV RBC AUTO: 96.2 FL (ref 81.4–97.8)
MONOCYTES # BLD AUTO: 0.22 K/UL (ref 0–0.85)
MONOCYTES NFR BLD AUTO: 0.9 % (ref 0–13.4)
MORPHOLOGY BLD-IMP: NORMAL
NEUTROPHILS # BLD AUTO: 5.78 K/UL (ref 1.82–7.42)
NEUTROPHILS NFR BLD: 23.5 % (ref 44–72)
NRBC # BLD AUTO: 0.07 K/UL
NRBC BLD-RTO: 0.3 /100 WBC
OVALOCYTES BLD QL SMEAR: NORMAL
PATH REV: NORMAL
PATH REV: NORMAL
PLATELET # BLD AUTO: 74 K/UL (ref 164–446)
PLATELET BLD QL SMEAR: NORMAL
PMV BLD AUTO: 10.3 FL (ref 9–12.9)
POIKILOCYTOSIS BLD QL SMEAR: NORMAL
POLYCHROMASIA BLD QL SMEAR: NORMAL
RBC # BLD AUTO: 2.91 M/UL (ref 4.7–6.1)
RBC BLD AUTO: PRESENT
RETICS # AUTO: 0.2 M/UL (ref 0.04–0.06)
RETICS/RBC NFR: 6.9 % (ref 0.8–2.1)
SMUDGE CELLS BLD QL SMEAR: NORMAL
WBC # BLD AUTO: 24.6 K/UL (ref 4.8–10.8)

## 2020-02-06 PROCEDURE — A9270 NON-COVERED ITEM OR SERVICE: HCPCS | Performed by: STUDENT IN AN ORGANIZED HEALTH CARE EDUCATION/TRAINING PROGRAM

## 2020-02-06 PROCEDURE — 700102 HCHG RX REV CODE 250 W/ 637 OVERRIDE(OP): Performed by: INTERNAL MEDICINE

## 2020-02-06 PROCEDURE — 700102 HCHG RX REV CODE 250 W/ 637 OVERRIDE(OP): Performed by: STUDENT IN AN ORGANIZED HEALTH CARE EDUCATION/TRAINING PROGRAM

## 2020-02-06 PROCEDURE — 700111 HCHG RX REV CODE 636 W/ 250 OVERRIDE (IP): Performed by: STUDENT IN AN ORGANIZED HEALTH CARE EDUCATION/TRAINING PROGRAM

## 2020-02-06 PROCEDURE — 85007 BL SMEAR W/DIFF WBC COUNT: CPT

## 2020-02-06 PROCEDURE — P9016 RBC LEUKOCYTES REDUCED: HCPCS

## 2020-02-06 PROCEDURE — 99233 SBSQ HOSP IP/OBS HIGH 50: CPT | Mod: GC | Performed by: INTERNAL MEDICINE

## 2020-02-06 PROCEDURE — 700105 HCHG RX REV CODE 258

## 2020-02-06 PROCEDURE — 36430 TRANSFUSION BLD/BLD COMPNT: CPT

## 2020-02-06 PROCEDURE — 700105 HCHG RX REV CODE 258: Performed by: INTERNAL MEDICINE

## 2020-02-06 PROCEDURE — C9113 INJ PANTOPRAZOLE SODIUM, VIA: HCPCS | Performed by: STUDENT IN AN ORGANIZED HEALTH CARE EDUCATION/TRAINING PROGRAM

## 2020-02-06 PROCEDURE — 86945 BLOOD PRODUCT/IRRADIATION: CPT | Mod: 91

## 2020-02-06 PROCEDURE — 83605 ASSAY OF LACTIC ACID: CPT

## 2020-02-06 PROCEDURE — 86923 COMPATIBILITY TEST ELECTRIC: CPT

## 2020-02-06 PROCEDURE — 94664 DEMO&/EVAL PT USE INHALER: CPT

## 2020-02-06 PROCEDURE — 770020 HCHG ROOM/CARE - TELE (206)

## 2020-02-06 PROCEDURE — 85027 COMPLETE CBC AUTOMATED: CPT

## 2020-02-06 PROCEDURE — A9270 NON-COVERED ITEM OR SERVICE: HCPCS | Performed by: INTERNAL MEDICINE

## 2020-02-06 PROCEDURE — 80500 HCHG CLINICAL PATH CONSULT-LIMITED: CPT

## 2020-02-06 PROCEDURE — 85046 RETICYTE/HGB CONCENTRATE: CPT

## 2020-02-06 PROCEDURE — 36415 COLL VENOUS BLD VENIPUNCTURE: CPT

## 2020-02-06 RX ORDER — FUROSEMIDE 10 MG/ML
10 INJECTION INTRAMUSCULAR; INTRAVENOUS ONCE
Status: COMPLETED | OUTPATIENT
Start: 2020-02-06 | End: 2020-02-06

## 2020-02-06 RX ORDER — SODIUM CHLORIDE 9 MG/ML
INJECTION, SOLUTION INTRAVENOUS
Status: COMPLETED
Start: 2020-02-06 | End: 2020-02-06

## 2020-02-06 RX ORDER — SODIUM CHLORIDE 9 MG/ML
1000 INJECTION, SOLUTION INTRAVENOUS ONCE
Status: COMPLETED | OUTPATIENT
Start: 2020-02-06 | End: 2020-02-06

## 2020-02-06 RX ORDER — BUDESONIDE AND FORMOTEROL FUMARATE DIHYDRATE 160; 4.5 UG/1; UG/1
2 AEROSOL RESPIRATORY (INHALATION)
Status: DISCONTINUED | OUTPATIENT
Start: 2020-02-06 | End: 2020-02-08 | Stop reason: HOSPADM

## 2020-02-06 RX ADMIN — SODIUM CHLORIDE: 9 INJECTION, SOLUTION INTRAVENOUS at 01:15

## 2020-02-06 RX ADMIN — SODIUM CHLORIDE 1000 ML: 9 INJECTION, SOLUTION INTRAVENOUS at 17:30

## 2020-02-06 RX ADMIN — PANTOPRAZOLE SODIUM 40 MG: 40 INJECTION, POWDER, LYOPHILIZED, FOR SOLUTION INTRAVENOUS at 04:19

## 2020-02-06 RX ADMIN — FUROSEMIDE 10 MG: 10 INJECTION, SOLUTION INTRAMUSCULAR; INTRAVENOUS at 04:19

## 2020-02-06 RX ADMIN — GLYCOPYRROLATE 1 CAPSULE: 15.6 CAPSULE RESPIRATORY (INHALATION) at 17:24

## 2020-02-06 RX ADMIN — GLYCOPYRROLATE 1 CAPSULE: 15.6 CAPSULE RESPIRATORY (INHALATION) at 04:19

## 2020-02-06 RX ADMIN — PANTOPRAZOLE SODIUM 40 MG: 40 INJECTION, POWDER, LYOPHILIZED, FOR SOLUTION INTRAVENOUS at 17:27

## 2020-02-06 RX ADMIN — FINASTERIDE 5 MG: 5 TABLET, FILM COATED ORAL at 17:26

## 2020-02-06 RX ADMIN — BUDESONIDE AND FORMOTEROL FUMARATE DIHYDRATE 2 PUFF: 160; 4.5 AEROSOL RESPIRATORY (INHALATION) at 18:22

## 2020-02-06 ASSESSMENT — ENCOUNTER SYMPTOMS
FEVER: 0
FALLS: 0
DIZZINESS: 0
BLOOD IN STOOL: 0
VOMITING: 0
CLAUDICATION: 0
SPUTUM PRODUCTION: 0
BLURRED VISION: 0
WEIGHT LOSS: 1
HEADACHES: 0
ORTHOPNEA: 0
HEARTBURN: 0
NAUSEA: 0
STRIDOR: 0
ABDOMINAL PAIN: 0
PHOTOPHOBIA: 0
MYALGIAS: 0
PALPITATIONS: 0
WEAKNESS: 0
SHORTNESS OF BREATH: 0
PSYCHIATRIC NEGATIVE: 1
CHILLS: 0
DIARRHEA: 0
FOCAL WEAKNESS: 0
COUGH: 0

## 2020-02-06 NOTE — DIETARY
"Nutrition services: Day 2 of admit.  Richard Chang is a 69 y.o. male with admitting DX of Upper GI bleed, Sepsis  Consult received for weight loss per MD note    Visit with patient at bedside, pt reports  lb 2 weeks ago. Pt reports eating <50% of normal x2 weeks d/t food tasting like salt. Pt requested soft/easy to chew foods r/t poor dentition. Pt reports eating ~50% of breakfast today, still eating lunch. Pt declined Boost and snacks, agreeable to high protein milkshake.     Assessment:  Height: 185.4 cm (6' 1\")  Weight: 70.6 kg (155 lb 10.3 oz)  Body mass index is 20.53 kg/m²., BMI classification: Normal  Diet/Intake: Regular    Evaluation:   1. 6% weight loss over 2 weeks (severe) with poor PO intake <50% x2 weeks (severe)  2. PMH:  3. Labs:  4. Meds:    Malnutrition Risk: Pt with severe malnutrition in the context of acute illness related to GI bleed/sepsis as evidenced by 6% weight loss over 2 weeks (severe) with poor PO intake <50% x2 weeks (severe).       Recommendations/Plan:  1. High protein milkshake with lunch   2. Encourage intake of meals  3. Document intake of all meals as % taken in ADL's to provide interdisciplinary communication across all shifts.   4. Monitor weight.  5. Nutrition rep will continue to see patient for ongoing meal and snack preferences.     RD following        "

## 2020-02-06 NOTE — PROGRESS NOTES
[facility] ICU Transfer Note                                                                                         ICU Admit Date: 2/4/2020       ICU Discharge Date:  02/05/20        Primary Diagnosis:   Upper GI bleed      ICU Course Summary (Brief Narrative):    Patient is a 69-year-old male with a past medical history of non-Hodgkin's lymphoma (on surveillance monitoring, never treated), COPD, BPH, h/o self-catheterization since 4 years who presented as a transfer from [facility] for evaluation and treatment of GI bleed.  Noticed black tarry stools since 5 days.Has chronic heartburn, not on PPI.  Denies any history of ulcers, aspirin use, not on blood thinners.  Took 4 ibuprofen tablets 2 days ago for a headache but no other NSAID use.  Quit alcohol many years back, not a heavy drinker in the past.  Last colonoscopy 12 years back which was normal per patient.  Never got an EGD done.    Outside hospital course:  He was noted to be hypotensive with SBP in the 70s.  Labs showed leukocytosis of 38, Hgb 3.6 (baseline Hgb 12/2019 was 14.7), , INR 1.6, BUN 73, Cr 2 (baseline 1), bicarb 16, troponin 0.02, lactic acid 9.8, FOBT positive.  Chest x-ray showed bilateral pulmonary opacities.  He was given 1 L bolus, transfused 2 units PRBCs, started on vancomycin and Protonix drip and transferred to [facility].     In our ED, he got 1.5lt fluid bolus, was started on ceftriaxone and protonix. GI was consulted by EDP. Hb was 6.4 on arrival here. Was transfused 2 more units since admission. Currently hemoglobin at 7.1. EGD was done which showed a duodenal ulcer likely NSAID induced. Recommended resuming a regular diet and bid PPI. He was stable to be transferred to floor with atleast bid H&H monitoring. Iron panel showed normal iron but this was done after blood transfusions, so could be falsely normal.    Initial labs showed leukocytosis of 34, was started on C3 and doxycycline for possible CAP. Blood cx came back -ve.  Cause of leukocytosis was likely reactive from his NHL. LDH and uric acid levels were elevated. Imaging showed massive splenomegaly with a complex cystic mass. CT abdomen was not done given EUFEMIA. He also had recent weight loss, HARRIS- needs to f/u with haem/onc as an outpatient.    EUFEMIA is improving with IVF, so likely secondary to hypovolemia . Renal u/s showed no intrinsic kidney disease.      Important Events in the ICU:   - Central Line: None   - Intubation: None   - Pressors: None   - Lowe catheter: Yes  - Tube feeding: None  - Antibiotics: None   - Other procedures: EGD 2/5/20        Labs and imaging studies to be continued with their indications:  - CBC: Yes; for H&H  - CMP or BMP: Yes;   - Magnesium: Yes;  - Phosphorus: No   - Chest X-ray: No; unless having worsening respiratory status  - Other studies: None         Things to follow:   -  H&H at least bid   - resolution of EUFEMIA  - Consider CT abdomen pelvis after resolution of EUFEMIA for splenomegaly and splenic mass  - Consider outpatient haem onc consult- sees Dr. Danielson [facility]  - CXR showed a calcified granuloma in right lung base- please evaluate if this is new and needs follow up

## 2020-02-06 NOTE — DIETARY
"Nutrition services: Day 2 of admit.  Richard Chang is a 69 y.o. male with admitting DX of Upper GI bleed, Sepsis (HCC)    Consult received 2/4 for TF, which was an error. TF consult was intended to be for weight loss per D/w MD on 2/5. MD notes indicated \"malnutrition\", \"significant weight loss\", \"loss of appetite\" and \"cachetic\".  RD completed consult 2/5. Late entry today (2/6).    Assessment:  Height: 185.4 cm (6' 1\")  Weight: 70.6 kg (155 lb 10.3 oz)  Body mass index is 20.53 kg/m²., BMI classification: Normal (low-end)    Diet/Intake: Regular diet in place. PO intake ~50%.    Evaluation:   1. Spoke with pt and spouse while pt was eating lunch (pt ordered a cheeseburger, fries, and other side items). Pt indicated improved appetite and intake since PTA, saying it was the most he had eaten in several days. Pt had consumed ~50% of the burger @ that time. Pt has high-protein milkshakes ordered TID, as well as a full sandwich as an evening snack.   2. Regarding weight loss, pt said he weighed 182-186 lb ~4 years ago. Pt said he experienced severe weight loss related to illness around that time. However, pt (and wife) say that weight has been pretty stable ~150 lb for >1 year.  3. Discussed nutrition supplements. Pt said he used Ensure in the past but does not wish to have supplements (besides milkshakes) @ this time.   4. Nutrition Representative seeing pt daily for meal and snack choices. Pt's daily order totals 4845 kcal and 147 grams protein today, which is more than sufficient to promote weight maintenance/gain.   5. No additional RD interventions indicated @ this time.    Malnutrition Risk: Pt does not meet criteria for malnutrition per RD consult.    Recommendations/Plan:  1. Encourage intake of meals and snacks.  2. Document PO intake in ADLs to provide interdisciplinary communication across all shifts.   3. Monitor weight.  4. Nutrition Rep will continue to see pt for ongoing meal and snack preferences. "     RD available as further indicated.

## 2020-02-06 NOTE — RESPIRATORY CARE
COPD EDUCATION by COPD CLINICAL EDUCATOR  2/6/2020  at  1:37 PM by Ximena Fernandez, RRT     Patient interviewed by COPD education team.  Patient unable to participate in full program.  Short intervention has been conducted.  A comprehensive packet including information about COPD and home treatments with spacer instruct. Patient quit smoking 1993.

## 2020-02-06 NOTE — PROGRESS NOTES
Daily Progress Note:     Date of Service: 2/6/2020  Primary Team: [facility] IM Yellow Team   Attending: Rajesh Chapa MD  Senior Resident: Dr. Baker  Intern: Dr. Villarreal  Contact:  [phone]    Chief Complaint:   Bloody + Tarry stools     Subjective:  Says he feels better after transfusion of 2 pRBCs overnight. Persistent fatigue.   No postural dizziness on ambulation.   Persistent tarry stools, no BRB. Tolerating regular diet. No abdominal pain.       HOSPITAL COURSE    2/4/2020:   Admitted to the ICU after transfer from [facility] to [facility] for Hbg of 3.6,  with ongoing black tarry stools. Had sepsis, hemorraghic shock. Received IVF, C3 + vancomycin + IV pantoprazole and 2 units pRBC. Hgb 6.7 --> 7.3    2/5/2020:  Underwent EGD: Atrophic antrum, clean-based 2 cm duodenal ulcer in the duodenal bulb. H. Pylori via Bx pending. Abx d/c as Leukocytosis believed to be due to NHL. Transferred to medicine team. GI signed off.     2/6/2020  Ongoing melena, no BRBPR. Hgb dropped from 7.3 --> 6.9. Received pRBC x2, Hgb improved to 9.2. Ongoing daily H&H. PPI and regular diet.     Consultants/Specialty:  Critical Care  GI    Review of Systems:    Review of Systems   Constitutional: Positive for malaise/fatigue and weight loss. Negative for chills and fever.   HENT: Negative for congestion and tinnitus.    Eyes: Negative for blurred vision and photophobia.   Respiratory: Negative for cough, sputum production, shortness of breath and stridor.    Cardiovascular: Negative for chest pain, palpitations, orthopnea and claudication.   Gastrointestinal: Positive for melena. Negative for abdominal pain, blood in stool, diarrhea, heartburn, nausea and vomiting.   Genitourinary: Negative for dysuria, frequency and hematuria.   Musculoskeletal: Negative for falls, joint pain and myalgias.   Neurological: Negative for dizziness, focal weakness, weakness and headaches.   Psychiatric/Behavioral: Negative.        Objective  Data:   Physical Exam:   Vitals:   Temp:  [36.1 °C (96.9 °F)-37.4 °C (99.4 °F)] 37.4 °C (99.4 °F)  Pulse:  [] 108  Resp:  [16-22] 16  BP: ()/(57-80) 124/73  SpO2:  [90 %-95 %] 94 %    Physical Exam  Vitals signs and nursing note reviewed.   Constitutional:       Comments: Bitemporal wasting  looks chronically ill   HENT:      Mouth/Throat:      Mouth: Mucous membranes are dry.      Pharynx: No oropharyngeal exudate or posterior oropharyngeal erythema.   Cardiovascular:      Rate and Rhythm: Normal rate and regular rhythm.      Heart sounds: No murmur. No friction rub.   Pulmonary:      Effort: Pulmonary effort is normal. No respiratory distress.      Breath sounds: No stridor.   Abdominal:      General: Abdomen is flat. There is no distension.      Palpations: Abdomen is soft. There is no mass.   Musculoskeletal:      Right lower leg: Edema present.      Left lower leg: Edema present.   Neurological:      Cranial Nerves: No cranial nerve deficit.      Sensory: No sensory deficit.      Motor: No weakness.      Coordination: Coordination normal.      Gait: Gait normal.           Labs:   Results for orders placed or performed during the hospital encounter of 02/04/20   EC-ECHOCARDIOGRAM COMPLETE W/O CONT   Result Value Ref Range    Eject.Frac. MOD BP 66.67     Eject.Frac. MOD 4C 65.78     Eject.Frac. MOD 2C 67.46     Left Ventrical Ejection Fraction 55    Lactic acid (lactate)   Result Value Ref Range    Lactic Acid 5.4 (HH) 0.5 - 2.0 mmol/L   Lactic acid (lactate): Repeat if initial lactic acid result is greater than 2   Result Value Ref Range    Lactic Acid 3.4 (H) 0.5 - 2.0 mmol/L   CBC WITH DIFFERENTIAL   Result Value Ref Range    WBC 34.4 (HH) 4.8 - 10.8 K/uL    RBC 2.00 (L) 4.70 - 6.10 M/uL    Hemoglobin 6.4 (L) 14.0 - 18.0 g/dL    Hematocrit 20.6 (L) 42.0 - 52.0 %    .0 (H) 81.4 - 97.8 fL    MCH 32.0 27.0 - 33.0 pg    MCHC 31.1 (L) 33.7 - 35.3 g/dL    RDW 52.5 (H) 35.9 - 50.0 fL    Platelet  Count 112 (L) 164 - 446 K/uL    MPV 10.0 9.0 - 12.9 fL    Neutrophils-Polys 27.80 (L) 44.00 - 72.00 %    Lymphocytes 70.40 (H) 22.00 - 41.00 %    Monocytes 0.90 0.00 - 13.40 %    Eosinophils 0.00 0.00 - 6.90 %    Basophils 0.00 0.00 - 1.80 %    Nucleated RBC 0.20 /100 WBC    Neutrophils (Absolute) 9.87 (H) 1.82 - 7.42 K/uL    Lymphs (Absolute) 24.22 (H) 1.00 - 4.80 K/uL    Monos (Absolute) 0.31 0.00 - 0.85 K/uL    Eos (Absolute) 0.00 0.00 - 0.51 K/uL    Baso (Absolute) 0.00 0.00 - 0.12 K/uL    NRBC (Absolute) 0.07 K/uL   COMP METABOLIC PANEL   Result Value Ref Range    Sodium 137 135 - 145 mmol/L    Potassium 4.5 3.6 - 5.5 mmol/L    Chloride 112 96 - 112 mmol/L    Co2 16 (L) 20 - 33 mmol/L    Anion Gap 9.0 0.0 - 11.9    Glucose 112 (H) 65 - 99 mg/dL    Bun 82 (HH) 8 - 22 mg/dL    Creatinine 1.82 (H) 0.50 - 1.40 mg/dL    Calcium 8.5 8.5 - 10.5 mg/dL    AST(SGOT) 22 12 - 45 U/L    ALT(SGPT) 19 2 - 50 U/L    Alkaline Phosphatase 32 30 - 99 U/L    Total Bilirubin 1.3 0.1 - 1.5 mg/dL    Albumin 1.9 (L) 3.2 - 4.9 g/dL    Total Protein 3.7 (L) 6.0 - 8.2 g/dL    Globulin 1.8 (L) 1.9 - 3.5 g/dL    A-G Ratio 1.1 g/dL   URINALYSIS   Result Value Ref Range    Color Yellow     Character Clear     Specific Gravity 1.015 <1.035    Ph 5.0 5.0 - 8.0    Glucose Negative Negative mg/dL    Ketones Negative Negative mg/dL    Protein Negative Negative mg/dL    Bilirubin Negative Negative    Urobilinogen, Urine 0.2 Negative    Nitrite Negative Negative    Leukocyte Esterase Trace (A) Negative    Occult Blood Negative Negative    Micro Urine Req Microscopic    URINE CULTURE(NEW)   Result Value Ref Range    Significant Indicator POS (POS)     Source UR     Site URINE, CLEAN CATCH     Culture Result - (A)     Culture Result (A)      Coagulase-negative Staphylococcus species  10-50,000 cfu/mL     BLOOD CULTURE   Result Value Ref Range    Significant Indicator NEG     Source BLD     Site PERIPHERAL     Culture Result       No Growth  Note:  Blood cultures are incubated for 5 days and  are monitored continuously.Positive blood cultures  are called to the RN and reported as soon as  they are identified.     BLOOD CULTURE   Result Value Ref Range    Significant Indicator NEG     Source BLD     Site PERIPHERAL     Culture Result       No Growth  Note: Blood cultures are incubated for 5 days and  are monitored continuously.Positive blood cultures  are called to the RN and reported as soon as  they are identified.     Prothrombin Time   Result Value Ref Range    PT 18.4 (H) 12.0 - 14.6 sec    INR 1.49 (H) 0.87 - 1.13   ESTIMATED GFR   Result Value Ref Range    GFR If African American 45 (A) >60 mL/min/1.73 m 2    GFR If Non  37 (A) >60 mL/min/1.73 m 2   Procalcitonin   Result Value Ref Range    Procalcitonin 1.08 (H) <0.25 ng/mL   Troponin   Result Value Ref Range    Troponin T 47 (H) 6 - 19 ng/L   URINE MICROSCOPIC (W/UA)   Result Value Ref Range    WBC 5-10 (A) /hpf    RBC 0-2 (A) /hpf    Bacteria Negative None /hpf    Epithelial Cells Negative /hpf    Hyaline Cast 0-2 /lpf   COD - Adult (Type and Screen)   Result Value Ref Range    ABO Grouping Only B     Rh Grouping Only POS (A)     Antibody Screen-Cod NEG     Component R       RI                  RedBloodCellsIRR    [id number]   transfused   02/04/20   13:22      Product Type Red Blood Cells IRR LR     Dispense Status transfused     Unit Number (Barcoded) [id number]     Product Code (Barcoded) T0141W16     Blood Type (Barcoded) 5100     Component R       RI                  RedBloodCellsIRR    [id number]   transfused   02/05/20   04:37      Product Type Red Blood Cells IRR LR     Dispense Status transfused     Unit Number (Barcoded) [id number]     Product Code (Barcoded) C2050E82     Blood Type (Barcoded) 5100     Component R       RI                  RedBloodCellsIRR    [id number]   issued       02/06/20   01:19      Product Type Red Blood Cells IRR LR     Dispense Status  issued     Unit Number (Barcoded) [id number]     Product Code (Barcoded) X3428B36     Blood Type (Barcoded) 5100     Component R       RI                  RedBloodCellsIRR    [id number]   issued       02/06/20   04:20      Product Type Red Blood Cells IRR LR     Dispense Status issued     Unit Number (Barcoded) [id number]     Product Code (Barcoded) L2210E94     Blood Type (Barcoded) 5100    Influenza A/B By PCR (Adult - Flu Only)   Result Value Ref Range    Influenza virus A RNA Negative Negative    Influenza virus B, PCR Negative Negative   ABO Rh Confirm   Result Value Ref Range    ABO Rh Confirm B POS    DIFFERENTIAL MANUAL   Result Value Ref Range    Bands-Stabs 0.90 0.00 - 10.00 %    Manual Diff Status PERFORMED    PERIPHERAL SMEAR REVIEW   Result Value Ref Range    Peripheral Smear Review see below    PLATELET ESTIMATE   Result Value Ref Range    Plt Estimation Decreased    LDH   Result Value Ref Range    LDH Total 356 (H) 107 - 266 U/L   HAPTOGLOBIN   Result Value Ref Range    Haptoglobin 145 30 - 200 mg/dL   URIC ACID   Result Value Ref Range    Uric Acid 11.8 (H) 2.5 - 8.3 mg/dL   IRON/TOTAL IRON BIND   Result Value Ref Range    Iron 164 50 - 180 ug/dL    Total Iron Binding 182 (L) 250 - 450 ug/dL    % Saturation 90 (H) 15 - 55 %   FERRITIN   Result Value Ref Range    Ferritin >1500.0 (H) 22.0 - 322.0 ng/mL   CREATINE KINASE   Result Value Ref Range    CPK Total 22 0 - 154 U/L   GRAM STAIN   Result Value Ref Range    Significant Indicator .     Source RESP     Site SPUTUM     Gram Stain Result       Sputum Gram stain quality score is <1, probable  oropharyngeal contamination. Culture not performed.  Recollect if clinically indicated.     Lactic Acid Every four hours after STAT order   Result Value Ref Range    Lactic Acid 3.8 (H) 0.5 - 2.0 mmol/L   HEMOGLOBIN AND HEMATOCRIT   Result Value Ref Range    Hemoglobin 7.2 (L) 14.0 - 18.0 g/dL    Hematocrit 23.6 (L) 42.0 - 52.0 %   Lactic Acid Every four  hours after STAT order   Result Value Ref Range    Lactic Acid 2.1 (H) 0.5 - 2.0 mmol/L   HEMOGLOBIN AND HEMATOCRIT   Result Value Ref Range    Hemoglobin 6.7 (L) 14.0 - 18.0 g/dL    Hematocrit 21.9 (L) 42.0 - 52.0 %   Comp Metabolic Panel (CMP)   Result Value Ref Range    Sodium 136 135 - 145 mmol/L    Potassium 3.9 3.6 - 5.5 mmol/L    Chloride 109 96 - 112 mmol/L    Co2 22 20 - 33 mmol/L    Anion Gap 5.0 0.0 - 11.9    Glucose 107 (H) 65 - 99 mg/dL    Bun 70 (H) 8 - 22 mg/dL    Creatinine 1.74 (H) 0.50 - 1.40 mg/dL    Calcium 8.3 (L) 8.5 - 10.5 mg/dL    AST(SGOT) 18 12 - 45 U/L    ALT(SGPT) 19 2 - 50 U/L    Alkaline Phosphatase 29 (L) 30 - 99 U/L    Total Bilirubin 1.5 0.1 - 1.5 mg/dL    Albumin 1.9 (L) 3.2 - 4.9 g/dL    Total Protein 3.6 (L) 6.0 - 8.2 g/dL    Globulin 1.7 (L) 1.9 - 3.5 g/dL    A-G Ratio 1.1 g/dL   PREALBUMIN   Result Value Ref Range    Pre-Albumin 6.0 (L) 18.0 - 38.0 mg/dL   VITAMIN B12   Result Value Ref Range    Vitamin B12 -True Cobalamin 380 211 - 911 pg/mL   FOLATE   Result Value Ref Range    Folate -Folic Acid 8.4 >4.0 ng/mL   TSH WITH REFLEX TO FT4   Result Value Ref Range    TSH 3.430 0.380 - 5.330 uIU/mL   HEMOGLOBIN AND HEMATOCRIT   Result Value Ref Range    Hemoglobin 7.3 (L) 14.0 - 18.0 g/dL    Hematocrit 23.2 (L) 42.0 - 52.0 %   ESTIMATED GFR   Result Value Ref Range    GFR If  47 (A) >60 mL/min/1.73 m 2    GFR If Non  39 (A) >60 mL/min/1.73 m 2   RETICULOCYTES COUNT   Result Value Ref Range    Reticulocyte Count 6.4 (H) 0.8 - 2.1 %    Retic, Absolute 0.15 (H) 0.04 - 0.06 M/uL    Imm. Reticulocyte Fraction 44.9 (H) 9.3 - 17.4 %    Retic Hgb Equivalent 36.2 (H) 29.0 - 35.0 pg/cell   Histology Request   Result Value Ref Range    Pathology Request Sent to [facility]    CBC WITH DIFFERENTIAL   Result Value Ref Range    WBC 26.2 (H) 4.8 - 10.8 K/uL    RBC 2.27 (L) 4.70 - 6.10 M/uL    Hemoglobin 7.1 (L) 14.0 - 18.0 g/dL    Hematocrit 22.9 (L) 42.0 - 52.0 %    MCV  100.9 (H) 81.4 - 97.8 fL    MCH 31.3 27.0 - 33.0 pg    MCHC 31.0 (L) 33.7 - 35.3 g/dL    RDW 65.8 (H) 35.9 - 50.0 fL    Platelet Count 98 (L) 164 - 446 K/uL    MPV 10.6 9.0 - 12.9 fL    Neutrophils-Polys 31.00 (L) 44.00 - 72.00 %    Lymphocytes 68.10 (H) 22.00 - 41.00 %    Monocytes 0.00 0.00 - 13.40 %    Eosinophils 0.90 0.00 - 6.90 %    Basophils 0.00 0.00 - 1.80 %    Nucleated RBC 0.20 /100 WBC    Neutrophils (Absolute) 8.12 (H) 1.82 - 7.42 K/uL    Lymphs (Absolute) 17.84 (H) 1.00 - 4.80 K/uL    Monos (Absolute) 0.00 0.00 - 0.85 K/uL    Eos (Absolute) 0.24 0.00 - 0.51 K/uL    Baso (Absolute) 0.00 0.00 - 0.12 K/uL    NRBC (Absolute) 0.05 K/uL    Hypochromia 1+     Anisocytosis 2+     Microcytosis 2+    RETICULOCYTES COUNT   Result Value Ref Range    Reticulocyte Count 7.1 (H) 0.8 - 2.1 %    Retic, Absolute 0.16 (H) 0.04 - 0.06 M/uL    Imm. Reticulocyte Fraction 40.0 (H) 9.3 - 17.4 %    Retic Hgb Equivalent 36.6 (H) 29.0 - 35.0 pg/cell   DIFFERENTIAL MANUAL   Result Value Ref Range    Manual Diff Status PERFORMED    PERIPHERAL SMEAR REVIEW   Result Value Ref Range    Peripheral Smear Review see below    PLATELET ESTIMATE   Result Value Ref Range    Plt Estimation Decreased    MORPHOLOGY   Result Value Ref Range    RBC Morphology Present     Polychromia 2+     Poikilocytosis 2+     Ovalocytes 1+     Echinocytes 2+     Smudge Cells Moderate    HEMOGLOBIN AND HEMATOCRIT   Result Value Ref Range    Hemoglobin 6.9 (L) 14.0 - 18.0 g/dL    Hematocrit 21.4 (L) 42.0 - 52.0 %   CBC with Differential   Result Value Ref Range    WBC 24.6 (H) 4.8 - 10.8 K/uL    RBC 2.91 (L) 4.70 - 6.10 M/uL    Hemoglobin 9.2 (L) 14.0 - 18.0 g/dL    Hematocrit 28.0 (L) 42.0 - 52.0 %    MCV 96.2 81.4 - 97.8 fL    MCH 30.9 27.0 - 33.0 pg    MCHC 32.1 (L) 33.7 - 35.3 g/dL    RDW 55.3 (H) 35.9 - 50.0 fL    Platelet Count 74 (L) 164 - 446 K/uL    MPV 10.3 9.0 - 12.9 fL    Neutrophils-Polys 23.50 (L) 44.00 - 72.00 %    Lymphocytes 73.90 (H) 22.00 -  41.00 %    Monocytes 0.90 0.00 - 13.40 %    Eosinophils 1.70 0.00 - 6.90 %    Basophils 0.00 0.00 - 1.80 %    Nucleated RBC 0.30 /100 WBC    Neutrophils (Absolute) 5.78 1.82 - 7.42 K/uL    Lymphs (Absolute) 18.18 (H) 1.00 - 4.80 K/uL    Monos (Absolute) 0.22 0.00 - 0.85 K/uL    Eos (Absolute) 0.42 0.00 - 0.51 K/uL    Baso (Absolute) 0.00 0.00 - 0.12 K/uL    NRBC (Absolute) 0.07 K/uL    Anisocytosis 1+     Macrocytosis 1+    LACTIC ACID   Result Value Ref Range    Lactic Acid 2.3 (H) 0.5 - 2.0 mmol/L   RETICULOCYTES COUNT   Result Value Ref Range    Reticulocyte Count 6.9 (H) 0.8 - 2.1 %    Retic, Absolute 0.20 (H) 0.04 - 0.06 M/uL    Imm. Reticulocyte Fraction 39.9 (H) 9.3 - 17.4 %    Retic Hgb Equivalent 36.7 (H) 29.0 - 35.0 pg/cell   DIFFERENTIAL MANUAL   Result Value Ref Range    Manual Diff Status PERFORMED    PERIPHERAL SMEAR REVIEW   Result Value Ref Range    Peripheral Smear Review see below    PLATELET ESTIMATE   Result Value Ref Range    Plt Estimation Decreased    MORPHOLOGY   Result Value Ref Range    RBC Morphology Present     Polychromia 1+     Poikilocytosis 1+     Ovalocytes 1+     Echinocytes 1+     Smudge Cells Many    EKG   Result Value Ref Range    Report       [facility]    Test Date:  2020  Pt Name:    CAITLIN GREEN               Department: 161  MRN:        [id number]                      Room:       [facility]  Gender:     Male                         Technician: [facility]  :        1950                   Requested By:LISHA PERSON  Order #:    [id number]                    Reading MD: Inocencio Rao MD    Measurements  Intervals                                Axis  Rate:       86                           P:          130  IL:         149                          QRS:        75  QRSD:       92                           T:          55  QT:         376  QTc:        450    Interpretive Statements  SINUS RHYTHM  MINIMAL ST ELEVATION, LATERAL LEADS  No previous ECG available for  comparison  Electronically Signed On 2-5-2020 6:46:12 PST by Inocencio Rao MD         Imaging:   EC-ECHOCARDIOGRAM COMPLETE W/O CONT   Final Result      US-RENAL   Final Result         1.  Mildly increased echogenicity of the kidneys suggestive of medical renal disease.      2.  Small lower pole left renal cyst.      3.  Marked splenomegaly with complex cystic mass measuring 5.8 cm in diameter. Recommend CT scan of the abdomen with contrast for further evaluation      4.  Trace amount of free fluid in the pelvic cul-de-sac.      OUTSIDE IMAGES-DX CHEST   Final Result      DX-CHEST-PORTABLE (1 VIEW)   Final Result      1.  Diffuse interstitial opacities.      2.  Hyperinflated lungs.      3.  Bibasilar atelectasis versus scarring.            Gastrointestinal hemorrhage with melena- (present on admission)  Assessment & Plan  - black stools since 5 days PTA  - unclear etiology- ?PUD  - not alcoholic, no ASA/ chronic NSAID use  - symptomatic anemia with sob, LE edema  - Hgb at OSH at 3.6, got 2u PRBCs  - Improved to 6.4 on admission here  Plan:  - monitor on telemetry with continuous pulse oximetry  - NPO  - 2 large bore IVs  - got 2.5lts fluid bolus, continue maintenance IVF   - type and screen  - got 2u PRBCs since admission  - currently Hgb at 7.3  - f/u H&H q6hrs, transfuse for Hb<7  - Pantop 40 IV BID  - DDAVP x1 for uremic platelets  - GI on board- plan for EGD today        Sepsis (HCC)- (present on admission)  Assessment & Plan  - reoslved  - SIRS 3/4 on admission for tachycardia, tachypnea, leukocytosis  - elevated pro calcitonin at 1.08  - CXR showed diffuse interstitial opacities  Plan:  - blood cx negative  - leukocytosis likely reactive from underlying NHL and acute GI bleed  - discontinued abx  - f/u  sputum and urine cx        EUFEMIA (acute kidney injury) (HCC)- (present on admission)  Assessment & Plan  - improving  - likely hypovolemic  - renal U/S showed mildly increased echogenicity of the kidneys  suggestive of medical renal disease and a small lower pole left renal cyst  - ctm    Acute blood loss anemia- (present on admission)  Assessment & Plan  S/p 4 units pRBC here. Got 2 units at OSH.     monitor and transfuse for Hgb <7       Hypoalbuminemia- (present on admission)  Assessment & Plan  Severe.     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     Monitor    Leukocytosis- (present on admission)  Assessment & Plan  - likely reactive from GI bleed vs NHL  - blood cx -ve and patient improving  - discontinued abx    Peripheral edema- (present on admission)  Assessment & Plan  - likely from hypoalbuminemia  - echo showed an EF of 55% with normal regional wall motion    Malnutrition (HCC)- (present on admission)  Assessment & Plan  - severe protein calorie malnutrition  - low albumin levels  - f/u pre albumin levels  -BOOST    Metabolic acidosis- (present on admission)  Assessment & Plan  RESOLVED      Non Hodgkin's lymphoma (HCC)- (present on admission)  Assessment & Plan  - h/o NHL since 20 years  - only on surveillance, was never treated  - f/[facility]  Plan:  - given anemia, leukocytosis, weight loss- ordered LDH and uric acid which are elevated  - Renal u/s showed marked splenomegaly with complex cystic mass measuring 5.8 cm in diameter.  - Will consider CT abdomen once EUFEMIA resolves  - to be f/u by heam/onc as an outpatient    COPD (chronic obstructive pulmonary disease) (HCC)- (present on admission)  Assessment & Plan  - not in acute exacerbation  - RT and O2 per protcol  -Bronchodilators

## 2020-02-06 NOTE — PROGRESS NOTES
Gastroenterology Progress Note    Date & Time note created:    2/6/2020   9:10 AM     Patient ID:  Name:             Richard Chang    YOB: 1950  Age:                 69 y.o.  male  MRN:               [id number]                                                               Chief Complaint(s):      Dark stools    History of Present Illness:    This is a very pleasant 69 y.o. male with the past medical history as listed below who presented to [facility] for dark stools and was found to be severely anemic with hemodynamic instability and acute kidney injury, received 2 units pRBC at [place], was transferred to [facility] for a higher level of care. Also treated for pneumonia.     EGD yesterday showed clean based duodenal ulcer. Received another 2 units pRBC today, repeat hemoglobin 9.2. Patient remains stable. Feels well this morning.    Review of Systems:      Constitutional: Denies fevers, weight changes  Eyes: Denies changes in vision, jaundice  Ears/Nose/Throat/Mouth: Denies nasal congestion or sore throat   Cardiovascular: Denies chest pain or palpitations   Respiratory: Denies shortness of breath, denies cough  Gastrointestinal/Hepatic: Denies abdominal pain, nausea, vomiting, constipation  Genitourinary: Denies dysuria or frequency  Musculoskeletal/Rheum: Denies  joint pain and swelling, edema  Skin: Denies rash  Neurological: Denies headache, confusion, memory loss or focal weakness/parasthesias  Psychiatric: denies mood disorder   Endocrine: [place] thyroid problems  All other systems were reviewed and are negative (AMA/CMS criteria)            No interval change.    Past Medical History:   No past medical history on file.  Active Hospital Problems    Diagnosis   • Gastrointestinal hemorrhage with melena [K92.1]     Priority: High   • Acute blood loss anemia [D62]     Priority: Medium   • EUFEMIA (acute kidney injury) (HCC) [N17.9]     Priority: Medium   • Sepsis (HCC) [A41.9]     Priority: Medium   • COPD  (chronic obstructive pulmonary disease) (HCC) [J44.9]     Priority: Low   • Non Hodgkin's lymphoma (HCC) [C85.90]     Priority: Low   • Metabolic acidosis [E87.2]     Priority: Low   • Malnutrition (HCC) [E46]     Priority: Low   • Peripheral edema [R60.9]     Priority: Low   • Leukocytosis [D72.829]     Priority: Low   • Pulmonary nodule [R91.1]   • Splenic mass [R16.1]       Past Surgical History:  Past Surgical History:   Procedure Laterality Date   • PB UPPER GI ENDOSCOPY,BIOPSY N/A 2/5/2020    Procedure: GASTROSCOPY, WITH BIOPSY;  Surgeon: Barry Ayoub M.D.;  Location: SURGERY [facility];  Service: Gastroenterology       Hospital Medications:  Current Facility-Administered Medications   Medication Dose Frequency Provider Last Rate Last Dose   • glycopyrrolate (Seebri) 15.6 mcg inhalation capsule 1 Cap  1 Cap BID Av Wilkins M.D.   1 Cap at 02/06/20 0419   • Respiratory Care per Protocol   Continuous RT Terrie Angeles M.D.       • pantoprazole (PROTONIX) injection 40 mg  40 mg BID Terrie Angeles M.D.   40 mg at 02/06/20 0419   • finasteride (PROSCAR) tablet 5 mg  5 mg Q EVENING Terrie Angeles M.D.   5 mg at 02/05/20 1716   • albuterol inhaler 2 Puff  2 Puff Q6HRS PRN Terrie Angeles M.D.   2 Puff at 02/04/20 2020   Last reviewed on 2/4/2020  2:49 PM by Dong Bacon R.N.      Current Outpatient Medications:  Medications Prior to Admission   Medication Sig Dispense Refill Last Dose   • tamsulosin (FLOMAX) 0.4 MG capsule Take 0.4 mg by mouth every evening.   2/2/2020 at PM   • finasteride (PROSCAR) 5 MG Tab Take 5 mg by mouth every evening.   2/2/2020 at PM   • ibuprofen (MOTRIN) 200 MG Tab Take 800 mg by mouth every 6 hours as needed.   2/1/2020 at PRN   • ipratropium-albuterol (DUONEB) 0.5-2.5 (3) MG/3ML nebulizer solution 3 mL by Nebulization route every four hours as needed for Shortness of Breath.   2/4/2020 at 0300   • albuterol 108 (90 Base) MCG/ACT Aero Soln inhalation aerosol Inhale 2  "Puffs by mouth every 6 hours as needed for Shortness of Breath.   2/4/2020 at 0300       Medication Allergy:  Allergies   Allergen Reactions   • Pcn [Penicillins] Unspecified     \"childhood\"  Tolerated ceftriaxone 2/4/2019       Physical Exam:  Weight/BMI: Body mass index is 20.53 kg/m².  /80   Pulse (!) 105   Temp 37.3 °C (99.2 °F)   Resp 18   Ht 1.854 m (6' 1\")   Wt 70.6 kg (155 lb 10.3 oz)   SpO2 94%   Vitals:    02/06/20 0126 02/06/20 0151 02/06/20 0415 02/06/20 0500   BP: 105/65 108/66 119/67 140/80   Pulse: 98 (!) 101 99 (!) 105   Resp: 18 18 18 18   Temp: 36.8 °C (98.2 °F) 36.8 °C (98.3 °F) 36.9 °C (98.4 °F) 37.3 °C (99.2 °F)   TempSrc:       SpO2: 91% 90% 94% 94%   Weight:       Height:         Oxygen Therapy:  Pulse Oximetry: 94 %, O2 (LPM): 0, O2 Delivery: None (Room Air)    Intake/Output Summary (Last 24 hours) at 2/6/2020 0910  Last data filed at 2/6/2020 0330  Gross per 24 hour   Intake 729.17 ml   Output 1451 ml   Net -721.83 ml       General: Lying in hospital bed, no acute distrress  Neuro/Psych: Alert and oriented, answering questions appropriately  HEENT: Extraocular movements intact  CVS: Borderline tachycardic, regular rhythm, distant heart sounds  Respiratory: Did not appreciate wheezing or rhonchi, likely hyperinflated lungs bilaterally  Abdomen/: Soft, nontender, nondistended, positive bowel sounds  Extremities: Bilateral lower extremity pitting edema  Skin: Warm and dry  No interval change.    MDM (Data Review):     Per [facility] records:  CMP on December 6, 2019 all within normal limits except glucose 117 and cholesterol 112, including normal renal function of BUN 19 and creatinine 1.0  CBC on December 6, 2019 showing WBC 20.68, hemoglobin 14.7, hematocrit 45.4, MCV 94.4, RDW 13.9, platelets 167, segmented neutrophils 20%, lymphocytes 76%  Fecal occult blood negative on June 5, 2019  CBC on May 10, 2019 showing WBC 32.57, hemoglobin 14.6, hematocrit 45.1, MCV 95.8, RDW 14.6, " platelets 172, segmented neutrophils 21%, lymphocytes 78%   on May 4, 2016  Uric acid 4.8 on May 4, 2016  Iron studies dated September 7, 2005 include iron 71, percent saturation 26, , trans-ferritin 194, no ferritin performed    Lab Data Review:  Recent Results (from the past 24 hour(s))   Histology Request    Collection Time: 02/05/20 11:23 AM   Result Value Ref Range    Pathology Request Sent to Histo    CBC WITH DIFFERENTIAL    Collection Time: 02/05/20  1:20 PM   Result Value Ref Range    WBC 26.2 (H) 4.8 - 10.8 K/uL    RBC 2.27 (L) 4.70 - 6.10 M/uL    Hemoglobin 7.1 (L) 14.0 - 18.0 g/dL    Hematocrit 22.9 (L) 42.0 - 52.0 %    .9 (H) 81.4 - 97.8 fL    MCH 31.3 27.0 - 33.0 pg    MCHC 31.0 (L) 33.7 - 35.3 g/dL    RDW 65.8 (H) 35.9 - 50.0 fL    Platelet Count 98 (L) 164 - 446 K/uL    MPV 10.6 9.0 - 12.9 fL    Neutrophils-Polys 31.00 (L) 44.00 - 72.00 %    Lymphocytes 68.10 (H) 22.00 - 41.00 %    Monocytes 0.00 0.00 - 13.40 %    Eosinophils 0.90 0.00 - 6.90 %    Basophils 0.00 0.00 - 1.80 %    Nucleated RBC 0.20 /100 WBC    Neutrophils (Absolute) 8.12 (H) 1.82 - 7.42 K/uL    Lymphs (Absolute) 17.84 (H) 1.00 - 4.80 K/uL    Monos (Absolute) 0.00 0.00 - 0.85 K/uL    Eos (Absolute) 0.24 0.00 - 0.51 K/uL    Baso (Absolute) 0.00 0.00 - 0.12 K/uL    NRBC (Absolute) 0.05 K/uL    Hypochromia 1+     Anisocytosis 2+     Microcytosis 2+    RETICULOCYTES COUNT    Collection Time: 02/05/20  1:20 PM   Result Value Ref Range    Reticulocyte Count 7.1 (H) 0.8 - 2.1 %    Retic, Absolute 0.16 (H) 0.04 - 0.06 M/uL    Imm. Reticulocyte Fraction 40.0 (H) 9.3 - 17.4 %    Retic Hgb Equivalent 36.6 (H) 29.0 - 35.0 pg/cell   DIFFERENTIAL MANUAL    Collection Time: 02/05/20  1:20 PM   Result Value Ref Range    Manual Diff Status PERFORMED    PERIPHERAL SMEAR REVIEW    Collection Time: 02/05/20  1:20 PM   Result Value Ref Range    Peripheral Smear Review see below    PLATELET ESTIMATE    Collection Time: 02/05/20  1:20 PM    Result Value Ref Range    Plt Estimation Decreased    MORPHOLOGY    Collection Time: 02/05/20  1:20 PM   Result Value Ref Range    RBC Morphology Present     Polychromia 2+     Poikilocytosis 2+     Ovalocytes 1+     Echinocytes 2+     Smudge Cells Moderate    HEMOGLOBIN AND HEMATOCRIT    Collection Time: 02/05/20 10:35 PM   Result Value Ref Range    Hemoglobin 6.9 (L) 14.0 - 18.0 g/dL    Hematocrit 21.4 (L) 42.0 - 52.0 %   CBC with Differential    Collection Time: 02/06/20  7:56 AM   Result Value Ref Range    WBC 24.6 (H) 4.8 - 10.8 K/uL    RBC 2.91 (L) 4.70 - 6.10 M/uL    Hemoglobin 9.2 (L) 14.0 - 18.0 g/dL    Hematocrit 28.0 (L) 42.0 - 52.0 %    MCV 96.2 81.4 - 97.8 fL    MCH 30.9 27.0 - 33.0 pg    MCHC 32.1 (L) 33.7 - 35.3 g/dL    RDW 55.3 (H) 35.9 - 50.0 fL    Platelet Count 74 (L) 164 - 446 K/uL    MPV 10.3 9.0 - 12.9 fL    Nucleated RBC 0.30 /100 WBC    NRBC (Absolute) 0.07 K/uL   LACTIC ACID    Collection Time: 02/06/20  7:56 AM   Result Value Ref Range    Lactic Acid 2.3 (H) 0.5 - 2.0 mmol/L       MDM (Assessment and Plan):     Active Hospital Problems    Diagnosis   • Gastrointestinal hemorrhage with melena [K92.1]     Priority: High   • Acute blood loss anemia [D62]     Priority: Medium   • EUFEMIA (acute kidney injury) (HCC) [N17.9]     Priority: Medium   • Sepsis (HCC) [A41.9]     Priority: Medium   • COPD (chronic obstructive pulmonary disease) (HCC) [J44.9]     Priority: Low   • Non Hodgkin's lymphoma (HCC) [C85.90]     Priority: Low   • Metabolic acidosis [E87.2]     Priority: Low   • Malnutrition (HCC) [E46]     Priority: Low   • Peripheral edema [R60.9]     Priority: Low   • Leukocytosis [D72.829]     Priority: Low   • Pulmonary nodule [R91.1]   • Splenic mass [R16.1]       Imaging/Procedures Review:    EC-ECHOCARDIOGRAM COMPLETE W/O CONT   Final Result      US-RENAL   Final Result         1.  Mildly increased echogenicity of the kidneys suggestive of medical renal disease.      2.  Small lower pole  left renal cyst.      3.  Marked splenomegaly with complex cystic mass measuring 5.8 cm in diameter. Recommend CT scan of the abdomen with contrast for further evaluation      4.  Trace amount of free fluid in the pelvic cul-de-sac.      OUTSIDE IMAGES-DX CHEST   Final Result      DX-CHEST-PORTABLE (1 VIEW)   Final Result      1.  Diffuse interstitial opacities.      2.  Hyperinflated lungs.      3.  Bibasilar atelectasis versus scarring.          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   Stage [facility] per [facility]. Olmstead's note dated July 17, 2019   No treatment to date due to indolent disease   Patient following with oncology yearly  Colonic diverticulosis   Per [facility] problem list  Acute kidney injury without chronic kidney disease  History of skin cancer   Squamous cell carcinoma status post excision  Hypoalbuminemia  Chronic obstructive pulmonary disease  Remote tobacco use  Chronic low back pain  Benign prostatic hypertrophy with chronic outlet obstruction  Remote alcohol use    Blood loss anemia secondary to resolved upper GI bleed tertiary to duodenal ulcer. Suspect NSAID use as cause of ulcer. Rule out Helicobacter pylori, biopsies taken and sent to pathology. Hemoglobin improved after transfusion of 2 units pRBC today.    Recommendations  -Ceftriaxone discontinued  -Continue PPI BID for 30 days  -Avoid all NSADs  -Follow up biopsy results  -CBC and transfusion per primary team    GI will sign off, please contact us with any questions.    Richy Crawford PGY-3, Internal Medicine

## 2020-02-06 NOTE — WOUND TEAM
Received consult for sacrum. Pt presenting with blanchable redness and MASD. Barrier ointment applied and left at bedside. Sacral mepilex also applied. Danette RN notified. Order placed for waffle mattress/cushion. No advanced wound care needs, wound team to sign off and nursing to re-consult as needed. Nursing to implement pressure injury prevention RN protocol.

## 2020-02-06 NOTE — PROGRESS NOTES
2 RN skin check complete.   Devices in place NA.  Skin assessed under devices NA.  Confirmed pressure ulcers found on NA.  New potential pressure ulcers noted on NA. Wound consult placed NA.  The following interventions in place Pt encouraged to turn self side to side.    BL bruising on forearms  Sacrum pink and blanching.

## 2020-02-07 PROBLEM — N30.90 CYSTITIS: Status: ACTIVE | Noted: 2020-02-07

## 2020-02-07 LAB
ALBUMIN SERPL BCP-MCNC: 2.1 G/DL (ref 3.2–4.9)
ALBUMIN/GLOB SERPL: 1.2 G/DL
ALP SERPL-CCNC: 34 U/L (ref 30–99)
ALT SERPL-CCNC: 21 U/L (ref 2–50)
ANION GAP SERPL CALC-SCNC: 8 MMOL/L (ref 0–11.9)
AST SERPL-CCNC: 12 U/L (ref 12–45)
BACTERIA UR CULT: ABNORMAL
BACTERIA UR CULT: ABNORMAL
BILIRUB SERPL-MCNC: 1.9 MG/DL (ref 0.1–1.5)
BUN SERPL-MCNC: 38 MG/DL (ref 8–22)
CALCIUM SERPL-MCNC: 7.6 MG/DL (ref 8.5–10.5)
CHLORIDE SERPL-SCNC: 109 MMOL/L (ref 96–112)
CO2 SERPL-SCNC: 20 MMOL/L (ref 20–33)
CREAT SERPL-MCNC: 1.29 MG/DL (ref 0.5–1.4)
ERYTHROCYTE [DISTWIDTH] IN BLOOD BY AUTOMATED COUNT: 57.1 FL (ref 35.9–50)
GLOBULIN SER CALC-MCNC: 1.8 G/DL (ref 1.9–3.5)
GLUCOSE SERPL-MCNC: 97 MG/DL (ref 65–99)
HCT VFR BLD AUTO: 25 % (ref 42–52)
HGB BLD-MCNC: 8.3 G/DL (ref 14–18)
LACTATE BLD-SCNC: 2.7 MMOL/L (ref 0.5–2)
MCH RBC QN AUTO: 31.9 PG (ref 27–33)
MCHC RBC AUTO-ENTMCNC: 33.2 G/DL (ref 33.7–35.3)
MCV RBC AUTO: 96.2 FL (ref 81.4–97.8)
PLATELET # BLD AUTO: 60 K/UL (ref 164–446)
PMV BLD AUTO: 10.2 FL (ref 9–12.9)
POTASSIUM SERPL-SCNC: 3.3 MMOL/L (ref 3.6–5.5)
PROT SERPL-MCNC: 3.9 G/DL (ref 6–8.2)
RBC # BLD AUTO: 2.6 M/UL (ref 4.7–6.1)
SIGNIFICANT IND 70042: ABNORMAL
SITE SITE: ABNORMAL
SODIUM SERPL-SCNC: 137 MMOL/L (ref 135–145)
SOURCE SOURCE: ABNORMAL
WBC # BLD AUTO: 17.4 K/UL (ref 4.8–10.8)

## 2020-02-07 PROCEDURE — 36415 COLL VENOUS BLD VENIPUNCTURE: CPT

## 2020-02-07 PROCEDURE — 83605 ASSAY OF LACTIC ACID: CPT

## 2020-02-07 PROCEDURE — C9113 INJ PANTOPRAZOLE SODIUM, VIA: HCPCS | Performed by: STUDENT IN AN ORGANIZED HEALTH CARE EDUCATION/TRAINING PROGRAM

## 2020-02-07 PROCEDURE — 700102 HCHG RX REV CODE 250 W/ 637 OVERRIDE(OP): Performed by: STUDENT IN AN ORGANIZED HEALTH CARE EDUCATION/TRAINING PROGRAM

## 2020-02-07 PROCEDURE — A9270 NON-COVERED ITEM OR SERVICE: HCPCS | Performed by: STUDENT IN AN ORGANIZED HEALTH CARE EDUCATION/TRAINING PROGRAM

## 2020-02-07 PROCEDURE — 99232 SBSQ HOSP IP/OBS MODERATE 35: CPT | Mod: GC | Performed by: INTERNAL MEDICINE

## 2020-02-07 PROCEDURE — 700105 HCHG RX REV CODE 258: Performed by: STUDENT IN AN ORGANIZED HEALTH CARE EDUCATION/TRAINING PROGRAM

## 2020-02-07 PROCEDURE — 700111 HCHG RX REV CODE 636 W/ 250 OVERRIDE (IP): Performed by: STUDENT IN AN ORGANIZED HEALTH CARE EDUCATION/TRAINING PROGRAM

## 2020-02-07 PROCEDURE — 85027 COMPLETE CBC AUTOMATED: CPT

## 2020-02-07 PROCEDURE — 80053 COMPREHEN METABOLIC PANEL: CPT

## 2020-02-07 PROCEDURE — 770020 HCHG ROOM/CARE - TELE (206)

## 2020-02-07 RX ORDER — POTASSIUM CHLORIDE 20 MEQ/1
40 TABLET, EXTENDED RELEASE ORAL ONCE
Status: COMPLETED | OUTPATIENT
Start: 2020-02-07 | End: 2020-02-07

## 2020-02-07 RX ORDER — POTASSIUM CHLORIDE 20 MEQ/1
20 TABLET, EXTENDED RELEASE ORAL ONCE
Status: COMPLETED | OUTPATIENT
Start: 2020-02-07 | End: 2020-02-07

## 2020-02-07 RX ORDER — SODIUM CHLORIDE 9 MG/ML
1000 INJECTION, SOLUTION INTRAVENOUS ONCE
Status: COMPLETED | OUTPATIENT
Start: 2020-02-07 | End: 2020-02-07

## 2020-02-07 RX ADMIN — PANTOPRAZOLE SODIUM 40 MG: 40 INJECTION, POWDER, LYOPHILIZED, FOR SOLUTION INTRAVENOUS at 16:41

## 2020-02-07 RX ADMIN — SODIUM CHLORIDE 1000 ML: 9 INJECTION, SOLUTION INTRAVENOUS at 11:20

## 2020-02-07 RX ADMIN — ALBUTEROL SULFATE 2 PUFF: 90 AEROSOL, METERED RESPIRATORY (INHALATION) at 14:07

## 2020-02-07 RX ADMIN — POTASSIUM CHLORIDE 20 MEQ: 1500 TABLET, EXTENDED RELEASE ORAL at 08:26

## 2020-02-07 RX ADMIN — POTASSIUM CHLORIDE 40 MEQ: 1500 TABLET, EXTENDED RELEASE ORAL at 08:27

## 2020-02-07 RX ADMIN — PANTOPRAZOLE SODIUM 40 MG: 40 INJECTION, POWDER, LYOPHILIZED, FOR SOLUTION INTRAVENOUS at 05:53

## 2020-02-07 RX ADMIN — FINASTERIDE 5 MG: 5 TABLET, FILM COATED ORAL at 16:40

## 2020-02-07 RX ADMIN — CEFTRIAXONE SODIUM 1 G: 1 INJECTION, POWDER, FOR SOLUTION INTRAMUSCULAR; INTRAVENOUS at 16:41

## 2020-02-07 RX ADMIN — GLYCOPYRROLATE 1 CAPSULE: 15.6 CAPSULE RESPIRATORY (INHALATION) at 05:52

## 2020-02-07 RX ADMIN — GLYCOPYRROLATE 1 CAPSULE: 15.6 CAPSULE RESPIRATORY (INHALATION) at 16:41

## 2020-02-07 RX ADMIN — BUDESONIDE AND FORMOTEROL FUMARATE DIHYDRATE 2 PUFF: 160; 4.5 AEROSOL RESPIRATORY (INHALATION) at 05:58

## 2020-02-07 ASSESSMENT — ENCOUNTER SYMPTOMS
COUGH: 0
BLURRED VISION: 0
SPUTUM PRODUCTION: 0
SENSORY CHANGE: 0
CHILLS: 0
NAUSEA: 0
HEMOPTYSIS: 0
ORTHOPNEA: 0
BLOOD IN STOOL: 0
DIZZINESS: 0
DOUBLE VISION: 0
HEARTBURN: 0
WEIGHT LOSS: 0
PALPITATIONS: 0
VOMITING: 0
PND: 0
PHOTOPHOBIA: 0
DEPRESSION: 0
ABDOMINAL PAIN: 0
MYALGIAS: 0
DIARRHEA: 0
FEVER: 0
SHORTNESS OF BREATH: 0
SORE THROAT: 0
INSOMNIA: 0
WHEEZING: 0
STRIDOR: 0

## 2020-02-07 NOTE — PROGRESS NOTES
Bedside report received from JOE Hurtado. POC discussed with pt; all questions answered at this time. Patient needs addressed.  Fall Precautions in place

## 2020-02-07 NOTE — PROGRESS NOTES
Daily Progress Note:     Date of Service: 2/7/2020  Primary Team: [employer] IM Yellow Team   Attending: Rajesh Chapa MD  Senior Resident: Dr. Baker  Intern: Dr. Villarreal  Contact:  [phone]    Chief Complaint:   Bloody + Tarry stools     Subjective  No acute overnight events.  The patient reports 3 episodes of dark stools with improvement in the color and form.  He denies any fatigue or weakness or racing of the heart or dizziness.  He remains on regular diet with no abdominal discomfort..     HOSPITAL COURSE     2/4/2020:   Admitted to the ICU after transfer from [facility] to [facility] for Hbg of 3.6,  with ongoing black tarry stools. Had sepsis, hemorraghic shock. Received IVF, C3 + vancomycin + IV pantoprazole and 2 units pRBC. Hgb 6.7 --> 7.3     2/5/2020:  Underwent EGD: Atrophic antrum, clean-based 2 cm duodenal ulcer in the duodenal bulb. H. Pylori via Bx pending. Abx d/c as Leukocytosis believed to be due to NHL. Transferred to medicine team. GI signed off.      2/6/2020  Ongoing melena, no BRBPR. Hgb dropped from 7.3 --> 6.9. Received pRBC x2, Hgb improved to 9.2. Ongoing daily H&H. PPI and regular diet.     2/7/20  Drop in hemoglobin from 9.2 to 8.3. Off note he was given normal saline overnight.  Telemetry monitoring shows tachycardia.  Bolus of normal saline initiated.  Given the 3 episodes of dark stools, monitor patient overnight and likely discharge tomorrow if improves clinically.  Elevated lactic acid 2.3, monitor lactic acid levels in a.m.    Consultants/Specialty:  GI  Critical care    Review of Systems:   Review of Systems   Constitutional: Negative for chills, fever, malaise/fatigue and weight loss.   HENT: Negative for congestion, nosebleeds and sore throat.    Eyes: Negative for blurred vision, double vision and photophobia.   Respiratory: Negative for cough, hemoptysis, sputum production, shortness of breath, wheezing and stridor.    Cardiovascular: Negative for chest pain,  palpitations, orthopnea, leg swelling and PND.   Gastrointestinal: Positive for melena. Negative for abdominal pain, blood in stool, diarrhea, heartburn, nausea and vomiting.   Genitourinary: Negative for dysuria, frequency and urgency.   Musculoskeletal: Negative for myalgias.   Neurological: Negative for dizziness and sensory change.   Psychiatric/Behavioral: Negative for depression. The patient does not have insomnia.        Objective Data:   Physical Exam:   Vitals:   Temp:  [36.6 °C (97.8 °F)-37.2 °C (98.9 °F)] 36.6 °C (97.9 °F)  Pulse:  [] 98  Resp:  [16] 16  BP: (120-127)/(71-73) 124/72  SpO2:  [95 %-96 %] 95 %   CREATE MACRO BE SURE THAT THIS IS PERTINENT TO YOUR PATIENT!  Physical Exam  Constitutional:       General: He is not in acute distress.     Appearance: Normal appearance. He is not ill-appearing or diaphoretic.   HENT:      Head: Normocephalic and atraumatic.      Nose: Nose normal. No congestion or rhinorrhea.   Eyes:      General:         Right eye: No discharge.         Left eye: No discharge.      Extraocular Movements: Extraocular movements intact.      Conjunctiva/sclera: Conjunctivae normal.      Pupils: Pupils are equal, round, and reactive to light.   Neck:      Musculoskeletal: Normal range of motion and neck supple.   Cardiovascular:      Rate and Rhythm: Tachycardia present.      Pulses: Normal pulses.      Heart sounds: Normal heart sounds. No murmur.   Pulmonary:      Effort: Pulmonary effort is normal. No respiratory distress.      Breath sounds: Normal breath sounds. No wheezing.   Chest:      Chest wall: No tenderness.   Abdominal:      General: Abdomen is flat. Bowel sounds are normal. There is no distension.      Palpations: Abdomen is soft. There is no mass.      Tenderness: There is no tenderness.   Musculoskeletal: Normal range of motion.         General: No swelling, tenderness or deformity.   Skin:     General: Skin is warm.      Coloration: Skin is not jaundiced or  pale.   Neurological:      General: No focal deficit present.      Mental Status: He is alert and oriented to person, place, and time. Mental status is at baseline.      Cranial Nerves: No cranial nerve deficit.      Sensory: No sensory deficit.      Coordination: Coordination normal.      Deep Tendon Reflexes: Reflexes normal.   Psychiatric:         Mood and Affect: Mood normal.           Labs:   Lab Results   Component Value Date/Time    SODIUM 137 02/07/2020 03:06 AM    POTASSIUM 3.3 (L) 02/07/2020 03:06 AM    CHLORIDE 109 02/07/2020 03:06 AM    CO2 20 02/07/2020 03:06 AM    GLUCOSE 97 02/07/2020 03:06 AM    BUN 38 (H) 02/07/2020 03:06 AM    CREATININE 1.29 02/07/2020 03:06 AM      Lab Results   Component Value Date/Time    WBC 17.4 (H) 02/07/2020 03:06 AM    RBC 2.60 (L) 02/07/2020 03:06 AM    HEMOGLOBIN 8.3 (L) 02/07/2020 03:06 AM    HEMATOCRIT 25.0 (L) 02/07/2020 03:06 AM    MCV 96.2 02/07/2020 03:06 AM    MCH 31.9 02/07/2020 03:06 AM    MCHC 33.2 (L) 02/07/2020 03:06 AM    MPV 10.2 02/07/2020 03:06 AM    NEUTSPOLYS 23.50 (L) 02/06/2020 07:56 AM    LYMPHOCYTES 73.90 (H) 02/06/2020 07:56 AM    MONOCYTES 0.90 02/06/2020 07:56 AM    EOSINOPHILS 1.70 02/06/2020 07:56 AM    BASOPHILS 0.00 02/06/2020 07:56 AM    HYPOCHROMIA 1+ 02/05/2020 01:20 PM    ANISOCYTOSIS 1+ 02/06/2020 07:56 AM      Imaging:   EC-ECHOCARDIOGRAM COMPLETE W/O CONT   Final Result      US-RENAL   Final Result         1.  Mildly increased echogenicity of the kidneys suggestive of medical renal disease.      2.  Small lower pole left renal cyst.      3.  Marked splenomegaly with complex cystic mass measuring 5.8 cm in diameter. Recommend CT scan of the abdomen with contrast for further evaluation      4.  Trace amount of free fluid in the pelvic cul-de-sac.      OUTSIDE IMAGES-DX CHEST   Final Result      DX-CHEST-PORTABLE (1 VIEW)   Final Result      1.  Diffuse interstitial opacities.      2.  Hyperinflated lungs.      3.  Bibasilar atelectasis  versus scarring.          Gastrointestinal hemorrhage with melena- (present on admission)  Assessment & Plan  - black stools since 5 days PTA  - unclear etiology- ?PUD  - not alcoholic, no ASA/ chronic NSAID use  - symptomatic anemia with sob, LE edema  - Hgb at OSH at 3.6, got 2u PRBCs  - Improved to 6.4 on admission here    Plan:  - monitor on telemetry with continuous pulse oximetry  - 2 large bore IVs  - got 2.5lts fluid bolus, continue maintenance IVF   - type and screen  - currently Hgb at 8.3  - f/u H&H q6hrs, transfuse for Hb<7  - Pantop 40 IV BID  - DDAVP x1 for uremic platelets  - GI  EGD on  2/5/20:Blood loss anemia secondary to resolved upper GI bleed tertiary to duodenal ulcer. Suspect NSAID use as cause of ulcer. Rule out Helicobacter pylori, biopsies taken and sent to pathology  - Avoid Nsaids        Sepsis (HCC)- (present on admission)  Assessment & Plan  - reoslved  - SIRS 3/4 on admission for tachycardia, tachypnea, leukocytosis  - elevated pro calcitonin at 1.08  - CXR showed diffuse interstitial opacities  Plan:  - blood cx negative  - leukocytosis likely reactive from underlying NHL and acute GI bleed  - discontinued abx  - f/u  Sputum  - urine cx with Staph        EUFEMIA (acute kidney injury) (HCC)- (present on admission)  Assessment & Plan  - improving  - likely hypovolemic  - renal U/S showed mildly increased echogenicity of the kidneys suggestive of medical renal disease and a small lower pole left renal cyst  - ctm    Acute blood loss anemia- (present on admission)  Assessment & Plan  - symptomatic   - initial Hgb 3.6 at outside facility  - improved to 6.4 after 2u PRBCs on admission  - likely UGIB given melena  Plan:  - now Hgb at 8.3  - f/u H&H q6hrs, transfuse for Hgb <7  - watch for fluid overload, small dose of lasix post transfusion if needed  - iron panel showed normal iron and high ferritin- likely from his acute state and from NHL  - FOBT +ve at outside hospital  - See EGD results  above      Hypoalbuminemia- (present on admission)  Assessment & Plan  Severe.     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     Monitor      Leukocytosis- (present on admission)  Assessment & Plan  - likely reactive from GI bleed vs NHL  - blood cx -ve and patient improving  - discontinued abx    Peripheral edema- (present on admission)  Assessment & Plan  - likely from acute blood loss anemia  - also has orthopnea, PND and CXR showed b/l interstitial opacities  - echo showed an EF of 55% with normal regional wall motion  - resolved    Malnutrition (HCC)- (present on admission)  Assessment & Plan  - severe protein calorie malnutrition  - low albumin levels  - f/u pre albumin levels  - nutrition consult placed for weight loss   - BOOST    Metabolic acidosis- (present on admission)  Assessment & Plan  - improving  - likely secondary to lactic acidosis from hemorrhagic shock from acute anemia      Non Hodgkin's lymphoma (HCC)- (present on admission)  Assessment & Plan  - h/o NHL since 20 years  - only on surveillance, was never treated  - [facility]  Plan:  - given anemia, leukocytosis, weight loss- ordered LDH and uric acid which are elevated  - Renal u/s showed marked splenomegaly with complex cystic mass measuring 5.8 cm in diameter.  - Will consider CT abdomen once EUFEMIA resolves  - to be f/u by heam/onc as an outpatient    COPD (chronic obstructive pulmonary disease) (HCC)- (present on admission)  Assessment & Plan  - h/o COPD on inhalers at home  - not in acute exacerbation  - RT and 02 per protcol

## 2020-02-07 NOTE — ASSESSMENT & PLAN NOTE
Acute Cystitis  Staph Hominis Urine cultures  Started Rocephin , switched to Nitrofurantoin for 4 days

## 2020-02-07 NOTE — PROGRESS NOTES
Report received from Danielle. Pt received 2U PRBC today; Hgb 9.2 . Pt awake in bed A&Ox4; no complaints at this time. POC discussed; all questions answered. Bed locked in lowest position; call light in reach.

## 2020-02-07 NOTE — CARE PLAN
Problem: Communication  Goal: The ability to communicate needs accurately and effectively will improve  Outcome: PROGRESSING AS EXPECTED   Pt notifies staff of needs appropriately.    Problem: Safety  Goal: Will remain free from falls  Outcome: PROGRESSING AS EXPECTED   Pt low fall risk; educated to call prior to ambulating; pt calls appropriately.    Problem: Skin Integrity  Goal: Risk for impaired skin integrity will decrease  Outcome: PROGRESSING AS EXPECTED   Pt with waffle mattress in use; barrier cream in use on sacrum; encouraged to turn every 2 hours.

## 2020-02-07 NOTE — CARE PLAN
Problem: Communication  Goal: The ability to communicate needs accurately and effectively will improve  Outcome: PROGRESSING AS EXPECTED  Intervention: Educate patient and significant other/support system about the plan of care, procedures, treatments, medications and allow for questions  Flowsheets (Taken 2/7/2020 0730)  Pt & Family Have Been Educated on Methods Available to Report Concerns Related to Care, Treatment, Services, and Patient Safety Issues: Yes  Note:   Pt educated regarding plan of care and medications. All questions answered.        Problem: Safety  Goal: Will remain free from injury  Outcome: PROGRESSING AS EXPECTED  Intervention: Collaborate with Interdisciplinary Team for safe transfer and mobilization techniques  Flowsheets (Taken 2/7/2020 0700)  Assistive Devices: None  Note:   Fall precautions in place. Bed in lowest position. Non-skid socks in place. Personal possessions within reach. Mobility sign on door. Bed-alarm on. Call light within reach. Pt educated regarding fall prevention and states understanding.     Goal: Will remain free from falls  Outcome: PROGRESSING AS EXPECTED  Intervention: Implement fall precautions  Flowsheets  Taken 2/7/2020 0730  Bed Alarm: Yes - Alarm On  Chair/Bed Strip Alarm: Yes - Alarm On  Taken 2/7/2020 0700  Environmental Precautions: Treaded Slipper Socks on Patient;Personal Belongings, Wastebasket, Call Bell etc. in Easy Reach;Transferred to Stronger Side;Report Given to Other Health Care Providers Regarding Fall Risk;Bed in Low Position;Communication Sign for Patients & Families;Mobility Assessed & Appropriate Sign Placed  Note:   Fall precautions in place. Bed in lowest position. Non-skid socks in place. Personal possessions within reach. Mobility sign on door. Bed-alarm on. Call light within reach. Pt educated regarding fall prevention and states understanding.        Problem: Bowel/Gastric:  Goal: Normal bowel function is maintained or improved  Outcome:  PROGRESSING AS EXPECTED  Intervention: Educate patient and significant other/support system about diet, fluid intake, medications and activity to promote bowel function  Note:   Patient no longer having diarrhea. Stool remains black but is now formed with no bud blood. No N/V/D.      Problem: Knowledge Deficit  Goal: Knowledge of disease process/condition, treatment plan, diagnostic tests, and medications will improve  Outcome: PROGRESSING AS EXPECTED  Intervention: Explain information regarding disease process/condition, treatment plan, diagnostic tests, and medications and document in education  Note:   Pt educated regarding plan of care and medications. All questions answered.     Goal: Knowledge of the prescribed therapeutic regimen will improve  Outcome: PROGRESSING AS EXPECTED  Intervention: Discuss information regarding therpeutic regimen and document in education  Note:   Pt educated regarding plan of care and medications. All questions answered.

## 2020-02-08 ENCOUNTER — PATIENT OUTREACH (OUTPATIENT)
Dept: HEALTH INFORMATION MANAGEMENT | Facility: OTHER | Age: 70
End: 2020-02-08

## 2020-02-08 VITALS
SYSTOLIC BLOOD PRESSURE: 152 MMHG | BODY MASS INDEX: 20.28 KG/M2 | HEART RATE: 106 BPM | HEIGHT: 73 IN | RESPIRATION RATE: 14 BRPM | WEIGHT: 153 LBS | DIASTOLIC BLOOD PRESSURE: 94 MMHG | OXYGEN SATURATION: 94 % | TEMPERATURE: 97.6 F

## 2020-02-08 LAB
ALBUMIN SERPL BCP-MCNC: 2.3 G/DL (ref 3.2–4.9)
ALBUMIN/GLOB SERPL: 1 G/DL
ALP SERPL-CCNC: 42 U/L (ref 30–99)
ALT SERPL-CCNC: 23 U/L (ref 2–50)
ANION GAP SERPL CALC-SCNC: 10 MMOL/L (ref 0–11.9)
ANION GAP SERPL CALC-SCNC: 8 MMOL/L (ref 0–11.9)
ANION GAP SERPL CALC-SCNC: 9 MMOL/L (ref 0–11.9)
AST SERPL-CCNC: 13 U/L (ref 12–45)
BASOPHILS # BLD AUTO: 2 % (ref 0–1.8)
BASOPHILS # BLD: 0.34 K/UL (ref 0–0.12)
BILIRUB SERPL-MCNC: 1.6 MG/DL (ref 0.1–1.5)
BUN SERPL-MCNC: 31 MG/DL (ref 8–22)
BUN SERPL-MCNC: 31 MG/DL (ref 8–22)
BUN SERPL-MCNC: 32 MG/DL (ref 8–22)
CALCIUM SERPL-MCNC: 8 MG/DL (ref 8.5–10.5)
CALCIUM SERPL-MCNC: 8.2 MG/DL (ref 8.5–10.5)
CALCIUM SERPL-MCNC: 8.2 MG/DL (ref 8.5–10.5)
CHLORIDE SERPL-SCNC: 109 MMOL/L (ref 96–112)
CHLORIDE SERPL-SCNC: 109 MMOL/L (ref 96–112)
CHLORIDE SERPL-SCNC: 110 MMOL/L (ref 96–112)
CO2 SERPL-SCNC: 17 MMOL/L (ref 20–33)
CO2 SERPL-SCNC: 20 MMOL/L (ref 20–33)
CO2 SERPL-SCNC: 22 MMOL/L (ref 20–33)
CREAT SERPL-MCNC: 1.2 MG/DL (ref 0.5–1.4)
CREAT SERPL-MCNC: 1.23 MG/DL (ref 0.5–1.4)
CREAT SERPL-MCNC: 1.23 MG/DL (ref 0.5–1.4)
EOSINOPHIL # BLD AUTO: 0.34 K/UL (ref 0–0.51)
EOSINOPHIL NFR BLD: 2 % (ref 0–6.9)
ERYTHROCYTE [DISTWIDTH] IN BLOOD BY AUTOMATED COUNT: 70 FL (ref 35.9–50)
GLOBULIN SER CALC-MCNC: 2.3 G/DL (ref 1.9–3.5)
GLUCOSE SERPL-MCNC: 116 MG/DL (ref 65–99)
GLUCOSE SERPL-MCNC: 124 MG/DL (ref 65–99)
GLUCOSE SERPL-MCNC: 93 MG/DL (ref 65–99)
HCT VFR BLD AUTO: 26.3 % (ref 42–52)
HGB BLD-MCNC: 8.5 G/DL (ref 14–18)
LACTATE BLD-SCNC: 2.4 MMOL/L (ref 0.5–2)
LACTATE BLD-SCNC: 3.6 MMOL/L (ref 0.5–2)
LYMPHOCYTES # BLD AUTO: 10.94 K/UL (ref 1–4.8)
LYMPHOCYTES NFR BLD: 64 % (ref 22–41)
MAGNESIUM SERPL-MCNC: 1.4 MG/DL (ref 1.5–2.5)
MAGNESIUM SERPL-MCNC: 2.5 MG/DL (ref 1.5–2.5)
MANUAL DIFF BLD: NORMAL
MCH RBC QN AUTO: 31.6 PG (ref 27–33)
MCHC RBC AUTO-ENTMCNC: 32.3 G/DL (ref 33.7–35.3)
MCV RBC AUTO: 97.8 FL (ref 81.4–97.8)
MONOCYTES # BLD AUTO: 0 K/UL (ref 0–0.85)
MONOCYTES NFR BLD AUTO: 0 % (ref 0–13.4)
MORPHOLOGY BLD-IMP: NORMAL
NEUTROPHILS # BLD AUTO: 5.47 K/UL (ref 1.82–7.42)
NEUTROPHILS NFR BLD: 32 % (ref 44–72)
NRBC # BLD AUTO: 0 K/UL
NRBC BLD-RTO: 0 /100 WBC
PLATELET # BLD AUTO: 59 K/UL (ref 164–446)
PMV BLD AUTO: 10.4 FL (ref 9–12.9)
POTASSIUM SERPL-SCNC: 3.4 MMOL/L (ref 3.6–5.5)
POTASSIUM SERPL-SCNC: 3.7 MMOL/L (ref 3.6–5.5)
POTASSIUM SERPL-SCNC: 4 MMOL/L (ref 3.6–5.5)
PROT SERPL-MCNC: 4.6 G/DL (ref 6–8.2)
RBC # BLD AUTO: 2.69 M/UL (ref 4.7–6.1)
SODIUM SERPL-SCNC: 136 MMOL/L (ref 135–145)
SODIUM SERPL-SCNC: 138 MMOL/L (ref 135–145)
SODIUM SERPL-SCNC: 140 MMOL/L (ref 135–145)
WBC # BLD AUTO: 17.1 K/UL (ref 4.8–10.8)

## 2020-02-08 PROCEDURE — 99239 HOSP IP/OBS DSCHRG MGMT >30: CPT | Mod: GC | Performed by: INTERNAL MEDICINE

## 2020-02-08 PROCEDURE — 700111 HCHG RX REV CODE 636 W/ 250 OVERRIDE (IP): Performed by: STUDENT IN AN ORGANIZED HEALTH CARE EDUCATION/TRAINING PROGRAM

## 2020-02-08 PROCEDURE — 83605 ASSAY OF LACTIC ACID: CPT | Mod: 91

## 2020-02-08 PROCEDURE — 80048 BASIC METABOLIC PNL TOTAL CA: CPT

## 2020-02-08 PROCEDURE — A9270 NON-COVERED ITEM OR SERVICE: HCPCS | Performed by: STUDENT IN AN ORGANIZED HEALTH CARE EDUCATION/TRAINING PROGRAM

## 2020-02-08 PROCEDURE — 83735 ASSAY OF MAGNESIUM: CPT | Mod: 91

## 2020-02-08 PROCEDURE — 36415 COLL VENOUS BLD VENIPUNCTURE: CPT

## 2020-02-08 PROCEDURE — 80053 COMPREHEN METABOLIC PANEL: CPT

## 2020-02-08 PROCEDURE — 85027 COMPLETE CBC AUTOMATED: CPT

## 2020-02-08 PROCEDURE — 97161 PT EVAL LOW COMPLEX 20 MIN: CPT

## 2020-02-08 PROCEDURE — 85007 BL SMEAR W/DIFF WBC COUNT: CPT

## 2020-02-08 PROCEDURE — C9113 INJ PANTOPRAZOLE SODIUM, VIA: HCPCS | Performed by: STUDENT IN AN ORGANIZED HEALTH CARE EDUCATION/TRAINING PROGRAM

## 2020-02-08 PROCEDURE — 700102 HCHG RX REV CODE 250 W/ 637 OVERRIDE(OP): Performed by: STUDENT IN AN ORGANIZED HEALTH CARE EDUCATION/TRAINING PROGRAM

## 2020-02-08 RX ORDER — OMEPRAZOLE 20 MG/1
40 CAPSULE, DELAYED RELEASE ORAL 2 TIMES DAILY
Qty: 90 CAP | Refills: 0 | Status: SHIPPED | OUTPATIENT
Start: 2020-02-08

## 2020-02-08 RX ORDER — ASCORBIC ACID 500 MG
500 TABLET ORAL DAILY
Status: DISCONTINUED | OUTPATIENT
Start: 2020-02-08 | End: 2020-02-08 | Stop reason: HOSPADM

## 2020-02-08 RX ORDER — MAGNESIUM SULFATE HEPTAHYDRATE 40 MG/ML
4 INJECTION, SOLUTION INTRAVENOUS ONCE
Status: COMPLETED | OUTPATIENT
Start: 2020-02-08 | End: 2020-02-08

## 2020-02-08 RX ORDER — POTASSIUM CHLORIDE 20 MEQ/1
20 TABLET, EXTENDED RELEASE ORAL ONCE
Status: COMPLETED | OUTPATIENT
Start: 2020-02-08 | End: 2020-02-08

## 2020-02-08 RX ORDER — FERROUS SULFATE 325(65) MG
325 TABLET ORAL
Qty: 7 TAB | Refills: 0 | Status: SHIPPED | OUTPATIENT
Start: 2020-02-08 | End: 2020-02-22

## 2020-02-08 RX ORDER — ASCORBIC ACID 500 MG
500 TABLET ORAL DAILY
Qty: 30 TAB | Refills: 3 | Status: SHIPPED | OUTPATIENT
Start: 2020-02-08 | End: 2020-02-22

## 2020-02-08 RX ORDER — NITROFURANTOIN 25; 75 MG/1; MG/1
100 CAPSULE ORAL 2 TIMES DAILY
Qty: 8 CAP | Refills: 0 | Status: SHIPPED | OUTPATIENT
Start: 2020-02-08 | End: 2020-02-12

## 2020-02-08 RX ORDER — FERROUS SULFATE 325(65) MG
325 TABLET ORAL
Status: DISCONTINUED | OUTPATIENT
Start: 2020-02-08 | End: 2020-02-08 | Stop reason: HOSPADM

## 2020-02-08 RX ADMIN — MAGNESIUM SULFATE IN WATER 4 G: 40 INJECTION, SOLUTION INTRAVENOUS at 09:17

## 2020-02-08 RX ADMIN — POTASSIUM CHLORIDE 20 MEQ: 1500 TABLET, EXTENDED RELEASE ORAL at 09:17

## 2020-02-08 RX ADMIN — BUDESONIDE AND FORMOTEROL FUMARATE DIHYDRATE 2 PUFF: 160; 4.5 AEROSOL RESPIRATORY (INHALATION) at 09:17

## 2020-02-08 RX ADMIN — PANTOPRAZOLE SODIUM 40 MG: 40 INJECTION, POWDER, LYOPHILIZED, FOR SOLUTION INTRAVENOUS at 04:48

## 2020-02-08 RX ADMIN — GLYCOPYRROLATE 1 CAPSULE: 15.6 CAPSULE RESPIRATORY (INHALATION) at 04:48

## 2020-02-08 RX ADMIN — OXYCODONE HYDROCHLORIDE AND ACETAMINOPHEN 500 MG: 500 TABLET ORAL at 11:20

## 2020-02-08 RX ADMIN — FERROUS SULFATE TAB 325 MG (65 MG ELEMENTAL FE) 325 MG: 325 (65 FE) TAB at 11:20

## 2020-02-08 ASSESSMENT — ENCOUNTER SYMPTOMS
PALPITATIONS: 0
PND: 0
DIZZINESS: 0
COUGH: 0
INSOMNIA: 0
ORTHOPNEA: 0
SORE THROAT: 0
SHORTNESS OF BREATH: 0
WHEEZING: 0
ABDOMINAL PAIN: 0
HEARTBURN: 0
DOUBLE VISION: 0
BLURRED VISION: 0
BLOOD IN STOOL: 0
STRIDOR: 0
DEPRESSION: 0
SPUTUM PRODUCTION: 0
MYALGIAS: 0
DIARRHEA: 0
PHOTOPHOBIA: 0
WEIGHT LOSS: 0
HEMOPTYSIS: 0
VOMITING: 0
NAUSEA: 0
SENSORY CHANGE: 0
CHILLS: 0
FEVER: 0

## 2020-02-08 ASSESSMENT — COGNITIVE AND FUNCTIONAL STATUS - GENERAL
SUGGESTED CMS G CODE MODIFIER MOBILITY: CH
MOBILITY SCORE: 24

## 2020-02-08 ASSESSMENT — GAIT ASSESSMENTS
DISTANCE (FEET): 150
GAIT LEVEL OF ASSIST: SUPERVISED

## 2020-02-08 NOTE — PROGRESS NOTES
Assumed care at 0700. Bedside report received from Katie. Patient's chart and MAR reviewed. Pt denies pain at this time. Pt is A & O 4. Patient was updated on plan of care for the day. Questions answered and concerns addressed.  Pt denies any additional needs at this time. White board updated. Call light, phone and personal belongings within reach.

## 2020-02-08 NOTE — DISCHARGE SUMMARY
Discharge Summary    Date of Admission: 2/4/2020  Date of Discharge: 2/8/2020  1:26 PM  Discharging Attending: Dr. Chapa  Discharging Senior Resident: Dr. Baker    Discharging Intern: Dr. Villarreal    CHIEF COMPLAINT ON ADMISSION  Chief Complaint   Patient presents with   • Bloody Stools     Hgb 3       Reason for Admission  Shortness of breath    Admission Date  2/4/2020    CODE STATUS  Prior    HPI & HOSPITAL COURSE  This is a 69 y.o. male here with acute onset upper GI bleed presumed to be secondary to NSAID-induced duodenitis.  History significant for non-Hodgkin's lymphoma on surveillance therapy, chronic intermittent catheterization for BPH, and COPD. Patient was transferred from [facility] for evaluation of GI bleed and hemorrhagic shock. He presented to that facility with 5 days episode of black tarry stools.  His hemoglobin was reportedly 3.6.  He says he took 4 doses of ibuprofen at the same time prior to the onset of his symptoms.  He received 2 units of packed red blood cells (pRBCs) and was transferred to [facility].  He received appropriate IV fluid resuscitation, IV Protonix and Gastroenterologist was consulted in the emergency department.  He was started on broad-spectrum antibiotics for concern of sepsis.      He was admitted to the ICU where he received additional 2 units of pRBCs. He underwent upper endoscopy which demonstrated atrophic antrum, clean-based 2 cm duodenal ulcer in the duodenal bulb.  Helicobacter pylori via biopsy was negative. Antibiotics were discontinued on realizing that his leukocytosis was secondary to his untreated non-Hodgkin's lymphoma.  He also had mildly elevated lactic acid, which remained stable during his hospitalization.  Urine culture was positive for Staphylococcus hominis.  Patient does intermittent self-catheterization.  He complained of dysuria.  He was started on nitrofurantoin.  He also had acute kidney injury which resolved with IV  rehydration.    He was transferred to the medicine floor.  He continued to have black tarry stools and hemoglobin subsequently dropped.  He received additional 2 units of pRBCs and IV Protonix was continued.  Stool frequency lessened, and progressively became less dark.  Hemoglobin remained stable.  Patient ambulated freely and did not have any evidence of gait instability or postural dizziness.  Hospital course was complicated by development of hypocalcemia and hypomagnesemia and hyperkalemia.  These were appropriately replenished.     Therefore, he is discharged in fair and stable condition to home with close outpatient follow-up.    The patient met 2-midnight criteria for an inpatient stay at the time of discharge.    Discharge Date  2/8/2020    FOLLOW UP ITEMS POST DISCHARGE  [facility].  Repeat iron studies after completion of iron sulfate therapy.  [facility] Dr. Ward for non-Hodgkin's lymphoma management    DISCHARGE DIAGNOSES  Active Problems:    Gastrointestinal hemorrhage with melena POA: Yes    Acute blood loss anemia POA: Yes    EUFEMIA (acute kidney injury) (HCC) POA: Yes    Sepsis (HCC) POA: Yes    Pulmonary nodule POA: Unknown    Splenic mass POA: Unknown    Thrombocytopathia (HCC) POA: Yes    Hypoalbuminemia POA: Yes    Cystitis POA: Unknown    COPD (chronic obstructive pulmonary disease) (HCC) POA: Yes    Non Hodgkin's lymphoma (HCC) POA: Yes    Metabolic acidosis POA: Yes    Malnutrition (HCC) POA: Yes    Peripheral edema POA: Yes    Leukocytosis POA: Yes  Resolved Problems:    * No resolved hospital problems. *      FOLLOW UP  No future appointments.  [facility]  [address] [address]  [phone]      Please call to schedule your appointment with your Primary Care Physician. You will need to get referrals for Gastroentrology and Oncology. Thank you       MEDICATIONS ON DISCHARGE    "  Medication List      START taking these medications      Instructions   ascorbic acid 500 MG tablet  Commonly known as:  VITAMIN C   Take 1 Tab by mouth every day for 14 days.  Dose:  500 mg     ferrous sulfate 325 (65 Fe) MG tablet   Take 1 Tab by mouth every 48 hours for 14 days.  Dose:  325 mg     magnesium chloride 64 MG Tbec  Commonly known as:  MAG-64   Take 1 Tab by mouth every day.  Dose:  64 mg     nitrofurantoin monohyd macro 100 MG Caps  Commonly known as:  MACROBID   Take 1 Cap by mouth 2 times a day for 4 days.  Dose:  100 mg     omeprazole 20 MG delayed-release capsule  Commonly known as:  PRILOSEC   Doctor's comments:  For 1 month then 40 mg DAILY FOR 1 MONTH  Take 2 Caps by mouth 2 times a day.  Dose:  40 mg        CONTINUE taking these medications      Instructions   albuterol 108 (90 Base) MCG/ACT Aers inhalation aerosol   Inhale 2 Puffs by mouth every 6 hours as needed for Shortness of Breath.  Dose:  2 Puff     finasteride 5 MG Tabs  Commonly known as:  PROSCAR   Take 5 mg by mouth every evening.  Dose:  5 mg     ipratropium-albuterol 0.5-2.5 (3) MG/3ML nebulizer solution  Commonly known as:  DUONEB   3 mL by Nebulization route every four hours as needed for Shortness of Breath.  Dose:  3 mL     tamsulosin 0.4 MG capsule  Commonly known as:  FLOMAX   Take 0.4 mg by mouth every evening.  Dose:  0.4 mg        STOP taking these medications    ibuprofen 200 MG Tabs  Commonly known as:  MOTRIN            Allergies  Allergies   Allergen Reactions   • Pcn [Penicillins] Unspecified     \"childhood\"  Tolerated ceftriaxone 2/4/2019       DIET  No orders of the defined types were placed in this encounter.      ACTIVITY  As tolerated.  Weight bearing as tolerated    CONSULTATIONS  Critical care  Gastroenterology    PROCEDURES  None      "

## 2020-02-08 NOTE — PROGRESS NOTES
Monitor summary:     0.18/0.10/0.34     Sinus Rhythm 60 - 95     with rare, occasional PACs, are PVCs, multiple 1.2 - 1.3 second pauses

## 2020-02-08 NOTE — CARE PLAN
Problem: Safety  Goal: Will remain free from falls  Outcome: PROGRESSING AS EXPECTED     Problem: Infection  Goal: Will remain free from infection  Outcome: PROGRESSING AS EXPECTED     Problem: Venous Thromboembolism (VTW)/Deep Vein Thrombosis (DVT) Prevention:  Goal: Patient will participate in Venous Thrombosis (VTE)/Deep Vein Thrombosis (DVT)Prevention Measures  Outcome: PROGRESSING AS EXPECTED     Problem: Discharge Barriers/Planning  Goal: Patient's continuum of care needs will be met  Outcome: PROGRESSING AS EXPECTED     Problem: Skin Integrity  Goal: Risk for impaired skin integrity will decrease  Outcome: PROGRESSING AS EXPECTED

## 2020-02-08 NOTE — THERAPY
"68 y/o male adm for SOB dx: UGIB and sepsis. Pt reports pre-existing SOB. Intact motor and sensory components BLE, intact balance and coordination with no AD use.  No further acute PT services required at this time. EVAL done as pt pending DC.    Physical Therapy Evaluation completed.   Bed Mobility:  Supine to Sit: Modified Independent  Transfers: Sit to Stand: Supervised  Gait: Level Of Assist: Supervised with No Equipment Needed       Plan of Care: Patient with no further skilled PT needs in the acute care setting at this time  Discharge Recommendations: Equipment: No Equipment Needed. Post-acute therapy Anticipate that the patient will have no further physical therapy needs after discharge from the hospital.    See \"Rehab Therapy-Acute\" Patient Summary Report for complete documentation.     "

## 2020-02-08 NOTE — PROGRESS NOTES
Daily Progress Note:     Date of Service: 2/8/2020  Primary Team: [facility] IM Yellow Team   Attending: Rajesh Chapa MD  Senior Resident: Dr. Baker  Intern: Dr. Villarreal  Contact:  [phone]    Chief Complaint:   Bloody + Tarry stools     Subjective  No acute overnight events.  The patient reports to 3 formed bowel movements which are lighter in color from yesterday but not black.  He denies any fatigue or weakness or racing of the heart or dizziness.  He remains on regular diet with no abdominal discomfort..     HOSPITAL COURSE     2/4/2020:   Admitted to the ICU after transfer from [facility] to [facility] for Hbg of 3.6,  with ongoing black tarry stools. Had sepsis, hemorraghic shock. Received IVF, C3 + vancomycin + IV pantoprazole and 2 units pRBC. Hgb 6.7 --> 7.3     2/5/2020:  Underwent EGD: Atrophic antrum, clean-based 2 cm duodenal ulcer in the duodenal bulb. H. Pylori via Bx pending. Abx d/c as Leukocytosis believed to be due to NHL. Transferred to medicine team. GI signed off.      2/6/2020  Ongoing melena, no BRBPR. Hgb dropped from 7.3 --> 6.9. Received pRBC x2, Hgb improved to 9.2. Ongoing daily H&H. PPI and regular diet.     2/7/20  Drop in hemoglobin from 9.2 to 8.3. Off note he was given normal saline overnight.  Telemetry monitoring shows tachycardia.  Bolus of normal saline initiated.  Given the 3 episodes of dark stools, monitor patient overnight and likely discharge tomorrow if improves clinically.  Elevated lactic acid 2.3, monitor lactic acid levels in a.m.    2/8/20  Hemoglobin remained stable at 8.5.  Patient was given another bolus of normal saline and maintained sinus rhythm overnight with a pause of 1.2 and 1.3 seconds.  Given the positive urine culture for Staphylococcus hominis the patient was started on Rocephin yesterday and switch to nitrofurantoin today.  He remained asymptomatic.Lactic acid monitored , remains elevated bur decreased from yesterday  without change in  bicarbonate or anion gap. Electrolytes supplemented with repeat chem panel and lactic acid levels before being discharged.  The patient was evaluated by PT who recommended no further needs.  Omeprazole and magnesium chloride supplementation.  To follow up with PCP and Oncology on discharge.    Consultants/Specialty:  GI  Critical care    Review of Systems:   Review of Systems   Constitutional: Negative for chills, fever, malaise/fatigue and weight loss.   HENT: Negative for congestion, nosebleeds and sore throat.    Eyes: Negative for blurred vision, double vision and photophobia.   Respiratory: Negative for cough, hemoptysis, sputum production, shortness of breath, wheezing and stridor.    Cardiovascular: Negative for chest pain, palpitations, orthopnea, leg swelling and PND.   Gastrointestinal: Negative for abdominal pain, blood in stool, diarrhea, heartburn, melena, nausea and vomiting.   Genitourinary: Negative for dysuria, frequency and urgency.   Musculoskeletal: Negative for myalgias.   Neurological: Negative for dizziness and sensory change.   Psychiatric/Behavioral: Negative for depression. The patient does not have insomnia.        Objective Data:   Physical Exam:   Vitals:   Temp:  [36.6 °C (97.9 °F)-36.9 °C (98.4 °F)] 36.7 °C (98.1 °F)  Pulse:  [] 102  Resp:  [16] 16  BP: (112-126)/(72-78) 126/76  SpO2:  [94 %-97 %] 94 %     Physical Exam  Constitutional:       General: He is not in acute distress.     Appearance: Normal appearance. He is not ill-appearing or diaphoretic.   HENT:      Head: Normocephalic and atraumatic.      Nose: Nose normal. No congestion or rhinorrhea.   Eyes:      General:         Right eye: No discharge.         Left eye: No discharge.      Extraocular Movements: Extraocular movements intact.      Conjunctiva/sclera: Conjunctivae normal.      Pupils: Pupils are equal, round, and reactive to light.   Neck:      Musculoskeletal: Normal range of motion and neck supple.    Cardiovascular:      Rate and Rhythm: Normal rate.      Pulses: Normal pulses.      Heart sounds: Normal heart sounds. No murmur.   Pulmonary:      Effort: Pulmonary effort is normal. No respiratory distress.      Breath sounds: Normal breath sounds. No wheezing.   Chest:      Chest wall: No tenderness.   Abdominal:      General: Abdomen is flat. Bowel sounds are normal. There is no distension.      Palpations: Abdomen is soft. There is no mass.      Tenderness: There is no tenderness.   Musculoskeletal: Normal range of motion.         General: No swelling, tenderness or deformity.   Skin:     General: Skin is warm.      Coloration: Skin is not jaundiced or pale.   Neurological:      General: No focal deficit present.      Mental Status: He is alert and oriented to person, place, and time. Mental status is at baseline.      Cranial Nerves: No cranial nerve deficit.      Sensory: No sensory deficit.      Coordination: Coordination normal.      Deep Tendon Reflexes: Reflexes normal.   Psychiatric:         Mood and Affect: Mood normal.           Labs:   Lab Results   Component Value Date/Time    SODIUM 138 02/08/2020 08:57 AM    POTASSIUM 3.4 (L) 02/08/2020 08:57 AM    CHLORIDE 109 02/08/2020 08:57 AM    CO2 20 02/08/2020 08:57 AM    GLUCOSE 116 (H) 02/08/2020 08:57 AM    BUN 32 (H) 02/08/2020 08:57 AM    CREATININE 1.23 02/08/2020 08:57 AM      Lab Results   Component Value Date/Time    WBC 17.1 (H) 02/08/2020 06:39 AM    RBC 2.69 (L) 02/08/2020 06:39 AM    HEMOGLOBIN 8.5 (L) 02/08/2020 06:39 AM    HEMATOCRIT 26.3 (L) 02/08/2020 06:39 AM    MCV 97.8 02/08/2020 06:39 AM    MCH 31.6 02/08/2020 06:39 AM    MCHC 32.3 (L) 02/08/2020 06:39 AM    MPV 10.4 02/08/2020 06:39 AM    NEUTSPOLYS 32.00 (L) 02/08/2020 06:39 AM    LYMPHOCYTES 64.00 (H) 02/08/2020 06:39 AM    MONOCYTES 0.00 02/08/2020 06:39 AM    EOSINOPHILS 2.00 02/08/2020 06:39 AM    BASOPHILS 2.00 (H) 02/08/2020 06:39 AM    HYPOCHROMIA 1+ 02/05/2020 01:20 PM     ANISOCYTOSIS 1+ 02/06/2020 07:56 AM      Imaging:   EC-ECHOCARDIOGRAM COMPLETE W/O CONT   Final Result      US-RENAL   Final Result         1.  Mildly increased echogenicity of the kidneys suggestive of medical renal disease.      2.  Small lower pole left renal cyst.      3.  Marked splenomegaly with complex cystic mass measuring 5.8 cm in diameter. Recommend CT scan of the abdomen with contrast for further evaluation      4.  Trace amount of free fluid in the pelvic cul-de-sac.      OUTSIDE IMAGES-DX CHEST   Final Result      DX-CHEST-PORTABLE (1 VIEW)   Final Result      1.  Diffuse interstitial opacities.      2.  Hyperinflated lungs.      3.  Bibasilar atelectasis versus scarring.          Gastrointestinal hemorrhage with melena- (present on admission)  Assessment & Plan  - black stools since 5 days PTA  - unclear etiology- ?PUD  - not alcoholic, no ASA/ chronic NSAID use  - symptomatic anemia with sob, LE edema  - Hgb at OSH at 3.6, got 2u PRBCs  - Improved to 6.4 on admission here    Plan:  - monitor on telemetry with continuous pulse oximetry  - 2 large bore IVs  - got 2.5lts fluid bolus, continue maintenance IVF   - type and screen  - currently Hgb at 8.5  - Pantop 40 IV BID  - DDAVP x1 for uremic platelets  - GI  EGD on  2/5/20:Blood loss anemia secondary to resolved upper GI bleed tertiary to duodenal ulcer. Suspect NSAID use as cause of ulcer. Rule out Helicobacter pylori, biopsies taken and sent to pathology  - Avoid Nsaids  -Omeprazole 40 BID for a month followed by 40mg daily for a month   -Magnesium CL supplementation   -follow up with pcp        Sepsis (HCC)- (present on admission)  Assessment & Plan  - resolved  - SIRS 3/4 on admission for tachycardia, tachypnea, leukocytosis  - elevated pro calcitonin at 1.08  - CXR showed diffuse interstitial opacities  Plan:  - blood cx negative  - leukocytosis likely reactive from underlying NHL and acute GI bleed  - f/u  Sputum  - urine cx with  Staph        EUFEMIA (acute kidney injury) (HCC)- (present on admission)  Assessment & Plan  - improving  - likely hypovolemic  - renal U/S showed mildly increased echogenicity of the kidneys suggestive of medical renal disease and a small lower pole left renal cyst  - ctm    Acute blood loss anemia- (present on admission)  Assessment & Plan  - symptomatic   - initial Hgb 3.6 at outside facility  - improved to 6.4 after 2u PRBCs on admission  - likely UGIB given melena  Plan:  - now Hgb at 8.5  - iron panel showed normal iron and high ferritin- likely from his acute state and from NHL  - FOBT +ve at outside hospital  - See EGD results above  -Ferrous Sulphate and Vit C  -Follow up with PCP      Cystitis  Assessment & Plan  Acute Cystitis  Staph Hominis Urine cultures  Started Rocephin , switched to Nitrofurantoin for 4 days        Hypoalbuminemia- (present on admission)  Assessment & Plan  Severe.     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           Leukocytosis- (present on admission)  Assessment & Plan  - likely reactive from GI bleed vs NHL  - blood cx -ve and patient improving      Peripheral edema- (present on admission)  Assessment & Plan  - likely from acute blood loss anemia  - also has orthopnea, PND and CXR showed b/l interstitial opacities  - echo showed an EF of 55% with normal regional wall motion  - resolved    Malnutrition (HCC)- (present on admission)  Assessment & Plan  - severe protein calorie malnutrition  - low albumin levels  - f/u pre albumin levels  - nutrition consult placed for weight loss   - BOOST    Metabolic acidosis- (present on admission)  Assessment & Plan  - improving  - likely secondary to lactic acidosis from hemorrhagic shock from acute anemia      Non Hodgkin's lymphoma (HCC)- (present on admission)  Assessment & Plan  - h/o NHL since 20 years  - only on surveillance, was never treated  - [facility]   [facility]  Plan:  - given anemia, leukocytosis, weight loss- ordered LDH and uric acid which are elevated  - Renal u/s showed marked splenomegaly with complex cystic mass measuring 5.8 cm in diameter.  - to be f/u by heam/onc as an outpatient    COPD (chronic obstructive pulmonary disease) (HCC)- (present on admission)  Assessment & Plan  - h/o COPD on inhalers at home  - not in acute exacerbation

## 2020-02-08 NOTE — DISCHARGE INSTRUCTIONS
Discharge Instructions    Discharged to home by car with relative. Discharged via wheelchair, hospital escort: Yes.  Special equipment needed: Not Applicable    Be sure to schedule a follow-up appointment with your primary care doctor or any specialists as instructed.     Discharge Plan:   Diet Plan: Discussed  Activity Level: Discussed  Smoking Cessation Offered: Patient Refused  Confirmed Follow up Appointment: Patient to Call and Schedule Appointment  Confirmed Symptoms Management: Discussed  Medication Reconciliation Updated: Yes  Influenza Vaccine Indication: Not indicated: Previously immunized this influenza season and > 8 years of age    I understand that a diet low in cholesterol, fat, and sodium is recommended for good health. Unless I have been given specific instructions below for another diet, I accept this instruction as my diet prescription.     Other diet:    Special Instructions: None    · Is patient discharged on Warfarin / Coumadin?   No       Peptic Ulcer  A peptic ulcer is a painful sore in the lining of your esophagus, stomach, or in the first part of your small intestine. The main causes of an ulcer can be:  · An infection.  · Using certain pain medicines too often or too much.  · Smoking.  HOME CARE  · Avoid smoking, alcohol, and caffeine.  · Avoid foods that bother you.  · Only take medicine as told by your doctor. Do not take any medicines your doctor has not approved.  · Keep all doctor visits as told.  GET HELP IF:  · You do not get better in 7 days after starting treatment.  · You keep having an upset stomach (indigestion) or heartburn.  GET HELP RIGHT AWAY IF:  · You have sudden, sharp, or lasting belly (abdominal) pain.  · You have bloody, black, or tarry poop (stool).  · You throw up (vomit) blood or your throw up looks like coffee grounds.  · You get light-headed, weak, or feel like you will pass out (faint).  · You get sweaty or feel sticky and cold to the touch (clammy).  MAKE SURE  YOU:   · Understand these instructions.  · Will watch your condition.  · Will get help right away if you are not doing well or get worse.  This information is not intended to replace advice given to you by your health care provider. Make sure you discuss any questions you have with your health care provider.  Document Released: 03/14/2011 Document Revised: 01/08/2016 Document Reviewed: 09/17/2016  [employer] Interactive Patient Education © 2017 [employer]      Upper Gastrointestinal Bleeding  Upper gastrointestinal (GI) bleeding is bleeding from the swallowing tube (esophagus), stomach, or the first part of the small intestine (duodenum). If you have upper GI bleeding, you may vomit blood or have bloody or black stools.  Bleeding can range from mild to serious or even life-threatening. If there is a lot of bleeding, you may need to stay in the hospital.  What are the causes?  This condition may be caused by:  · Ulcer disease of the stomach (peptic ulcer) or duodenum. This is the most common cause of GI bleeding.  · Inflammation, irritation, or swelling of the esophagus (esophagitis).  · A tear in the esophagus.  · Cancer of the esophagus, stomach, or duodenum.  · An abnormal or weakened blood vessel in one of the upper GI structures.  · A bleeding disorder that impairs the formation of blood clots and causes easy bleeding (coagulopathy).  What increases the risk?  The following factors may make you more likely to develop this condition:  · Being older than 60 years of age.  · Being male.  · Having another long-term disease, especially liver or kidney disease.  · Having a stomach infection caused by Helicobacter pylori bacteria.  · Having frequent or severe vomiting.  · Abusing alcohol.  · Taking certain medicines for a long time, such as:  ¨ NSAIDs.  ¨ Anticoagulants.  What are the signs or symptoms?  Symptoms of this condition include:  · Vomiting blood.  · Black or maroon-colored stools.  · Bloody  stools.  · Weakness or dizziness.  · Heartburn.  · Abdominal pain.  · Difficulty swallowing.  · Weight loss.  · Yellow eyes or skin (jaundice).  · Racing heartbeat.  How is this diagnosed?  This condition may be diagnosed based on:  · Your symptoms and medical history.  · A physical exam. During the exam, your health care provider will check for signs of blood loss, such as low blood pressure and a rapid pulse.  · Tests, such as:  ¨ Blood tests to measure your blood cell count and to check for other signs of blood loss and clotting ability.  ¨ Blood tests to check your liver and kidney function.  ¨ A chest X-ray to look for a tear in the esophagus.  ¨ Endoscopy. In this procedure, a flexible scope is put down your esophagus and into your stomach or duodenum to look for the source of bleeding.  ¨ Angiogram. This may be done if the source of bleeding is not found during endoscopy. For an angiogram, X-rays are taken after a dye is injected into your bloodstream.  ¨ Nasogastric tube insertion. This is a tube passed through your nose and down into your stomach. It may be connected to a source of gentle suction to see if any blood comes out.  How is this treated?  Treatment for this condition depends on the cause of the bleeding. Active bleeding is treated at the hospital. Treatment may include:  · Getting fluids through an IV tube inserted into one of your veins.  · Getting blood through an IV tube (blood transfusion).  · Getting high doses of medicine through the IV to lower stomach acid. This may be done to treat ulcer disease.  · Having endoscopy to treat an area of bleeding with high heat (coagulation), injections, or surgical clips.  · Having a procedure that involves first doing an angiogram and then blocking blood flow to the bleeding site (embolization).  · Stopping or changing some of your regular medicines for a certain amount of time.  · Having other surgical procedures if initial treatments do not control  bleeding.  Follow these instructions at home:  · Take over-the-counter and prescription medicines only as told by your health care provider. You may need to avoid NSAIDs or other medicines that increase bleeding.  · Do not drink alcohol.  · Drink enough fluid to keep your urine clear or pale yellow.  · Follow instructions from your health care provider about eating or drinking restrictions.  · Return to your normal activities as told by your health care provider. Ask your health care provider what activities are safe for you.  · Do not use any tobacco products, such as cigarettes, chewing tobacco, and e-cigarettes. If you need help quitting, ask your health care provider.  · Keep all follow-up visits as told by your health care provider. This is important.  Contact a health care provider if:  · You have abdominal pain or heartburn.  · You have unexplained weight loss.  · You have trouble swallowing.  · You have frequent vomiting.  · You develop jaundice.  · You feel weak or dizzy.  · You need help to stop smoking or drinking alcohol.  Get help right away if:  · You have vomiting with blood.  · You have blood in your stools.  · You have severe cramps in your back or abdomen.  · Your symptoms of upper GI bleeding come back after treatment.  This information is not intended to replace advice given to you by your health care provider. Make sure you discuss any questions you have with your health care provider.  Document Released: 05/04/2017 Document Revised: 08/20/2017 Document Reviewed: 05/04/2017  [employer] Interactive Patient Education © 2017 [employer]      Nitrofurantoin tablets or capsules  What is this medicine?  NITROFURANTOIN (rosalio porras) is an antibiotic. It is used to treat urinary tract infections.  This medicine may be used for other purposes; ask your health care provider or pharmacist if you have questions.  COMMON BRAND NAME(S): Macrobid, Macrodantin, Urotoin  What should I tell my health  care provider before I take this medicine?  They need to know if you have any of these conditions:  -anemia  -diabetes  -glucose-6-phosphate dehydrogenase deficiency  -kidney disease  -liver disease  -lung disease  -other chronic illness  -an unusual or allergic reaction to nitrofurantoin, other antibiotics, other medicines, foods, dyes or preservatives  -pregnant or trying to get pregnant  -breast-feeding  How should I use this medicine?  Take this medicine by mouth with a glass of water. Follow the directions on the prescription label. Take this medicine with food or milk. Take your doses at regular intervals. Do not take your medicine more often than directed. Do not stop taking except on your doctor's advice.  Talk to your pediatrician regarding the use of this medicine in children. While this drug may be prescribed for selected conditions, precautions do apply.  Overdosage: If you think you have taken too much of this medicine contact a poison control center or emergency room at once.  NOTE: This medicine is only for you. Do not share this medicine with others.  What if I miss a dose?  If you miss a dose, take it as soon as you can. If it is almost time for your next dose, take only that dose. Do not take double or extra doses.  What may interact with this medicine?  -antacids containing magnesium trisilicate  -probenecid  -quinolone antibiotics like ciprofloxacin, lomefloxacin, norfloxacin and ofloxacin  -sulfinpyrazone  This list may not describe all possible interactions. Give your health care provider a list of all the medicines, herbs, non-prescription drugs, or dietary supplements you use. Also tell them if you smoke, drink alcohol, or use illegal drugs. Some items may interact with your medicine.  What should I watch for while using this medicine?  Tell your doctor or health care professional if your symptoms do not improve or if you get new symptoms. Drink several glasses of water a day. If you are  taking this medicine for a long time, visit your doctor for regular checks on your progress.  If you are diabetic, you may get a false positive result for sugar in your urine with certain brands of urine tests. Check with your doctor.  What side effects may I notice from receiving this medicine?  Side effects that you should report to your doctor or health care professional as soon as possible:  -allergic reactions like skin rash or hives, swelling of the face, lips, or tongue  -chest pain  -cough  -difficulty breathing  -dizziness, drowsiness  -fever or infection  -joint aches or pains  -pale or blue-tinted skin  -redness, blistering, peeling or loosening of the skin, including inside the mouth  -tingling, burning, pain, or numbness in hands or feet  -unusual bleeding or bruising  -unusually weak or tired  -yellowing of eyes or skin  Side effects that usually do not require medical attention (report to your doctor or health care professional if they continue or are bothersome):  -dark urine  -diarrhea  -headache  -loss of appetite  -nausea or vomiting  -temporary hair loss  This list may not describe all possible side effects. Call your doctor for medical advice about side effects. You may report side effects to FDA at 6-017-FDA-7649.  Where should I keep my medicine?  Keep out of the reach of children.  Store at room temperature between 15 and 30 degrees C (59 and 86 degrees F). Protect from light. Throw away any unused medicine after the expiration date.  NOTE: This sheet is a summary. It may not cover all possible information. If you have questions about this medicine, talk to your doctor, pharmacist, or health care provider.  © 2018 [employer] (2009-07-08 15:56:47)    Omeprazole capsules (sprinkle caps) - Rx  What is this medicine?  OMEPRAZOLE (oh ME pray zol) prevents the production of acid in the stomach. It is used to treat gastroesophageal reflux disease (GERD), ulcers, certain bacteria in the  stomach, inflammation of the esophagus, and Zollinger-Winter Syndrome. It is also used to treat other conditions that cause too much stomach acid.  This medicine may be used for other purposes; ask your health care provider or pharmacist if you have questions.  COMMON BRAND NAME(S): Prilosec  What should I tell my health care provider before I take this medicine?  They need to know if you have any of these conditions:  -liver disease  -low levels of magnesium in the blood  -lupus  -an unusual or allergic reaction to omeprazole, other medicines, foods, dyes, or preservatives  -pregnant or trying to get pregnant  -breast-feeding  How should I use this medicine?  Take this medicine by mouth with a glass of water. Follow the directions on the prescription label. Do not crush, break or chew the capsules. They can be opened and the contents sprinkled on a small amount of applesauce or yogurt, given with fruit juices, or swallowed immediately with water. This medicine works best if taken on an empty stomach 30 to 60 minutes before breakfast. Take your doses at regular intervals. Do not take your medicine more often than directed.  Talk to your pediatrician regarding the use of this medicine in children. Special care may be needed.  Overdosage: If you think you have taken too much of this medicine contact a poison control center or emergency room at once.  NOTE: This medicine is only for you. Do not share this medicine with others.  What if I miss a dose?  If you miss a dose, take it as soon as you can. If it is almost time for your next dose, take only that dose. Do not take double or extra doses.  What may interact with this medicine?  Do not take this medicine with any of the following medications:  -atazanavir  -clopidogrel  -nelfinavir  This medicine may also interact with the following medications:  -ampicillin  -certain medicines for anxiety or sleep  -certain medicines that treat or prevent blood clots like  warfarin  -cyclosporine  -diazepam  -digoxin  -disulfiram  -diuretics  -iron salts  -methotrexate  -mycophenolate mofetil  -phenytoin  -prescription medicine for fungal or yeast infection like itraconazole, ketoconazole, voriconazole  -saquinavir  -tacrolimus  This list may not describe all possible interactions. Give your health care provider a list of all the medicines, herbs, non-prescription drugs, or dietary supplements you use. Also tell them if you smoke, drink alcohol, or use illegal drugs. Some items may interact with your medicine.  What should I watch for while using this medicine?  It can take several days before your stomach pain gets better. Check with your doctor or health care professional if your condition does not start to get better, or if it gets worse.  You may need blood work done while you are taking this medicine.  What side effects may I notice from receiving this medicine?  Side effects that you should report to your doctor or health care professional as soon as possible:  -allergic reactions like skin rash, itching or hives, swelling of the face, lips, or tongue  -bone, muscle or joint pain  -breathing problems  -chest pain or chest tightness  -dark yellow or brown urine  -dizziness  -fast, irregular heartbeat  -feeling faint or lightheaded  -fever or sore throat  -muscle spasm  -palpitations  -rash on cheeks or arms that gets worse in the sun  -redness, blistering, peeling or loosening of the skin, including inside the mouth  -seizures  -tremors  -unusual bleeding or bruising  -unusually weak or tired  -yellowing of the eyes or skin  Side effects that usually do not require medical attention (report to your doctor or health care professional if they continue or are bothersome):  -constipation  -diarrhea  -dry mouth  -headache  -nausea  This list may not describe all possible side effects. Call your doctor for medical advice about side effects. You may report side effects to FDA at  1-800-FDA-1088.  Where should I keep my medicine?  Keep out of the reach of children.  Store at room temperature between 15 and 30 degrees C (59 and 86 degrees F). Protect from light and moisture. Throw away any unused medicine after the expiration date.  NOTE: This sheet is a summary. It may not cover all possible information. If you have questions about this medicine, talk to your doctor, pharmacist, or health care provider.  © 2018 [employer] (2017-01-19 12:18:47)      Depression / Suicide Risk    As you are discharged from this [facility] facility, it is important to learn how to keep safe from harming yourself.    Recognize the warning signs:  · Abrupt changes in personality, positive or negative- including increase in energy   · Giving away possessions  · Change in eating patterns- significant weight changes-  positive or negative  · Change in sleeping patterns- unable to sleep or sleeping all the time   · Unwillingness or inability to communicate  · Depression  · Unusual sadness, discouragement and loneliness  · Talk of wanting to die  · Neglect of personal appearance   · Rebelliousness- reckless behavior  · Withdrawal from people/activities they love  · Confusion- inability to concentrate     If you or a loved one observes any of these behaviors or has concerns about self-harm, here's what you can do:  · Talk about it- your feelings and reasons for harming yourself  · Remove any means that you might use to hurt yourself (examples: pills, rope, extension cords, firearm)  · Get professional help from the community (Mental Health, Substance Abuse, psychological counseling)  · Do not be alone:Call your Safe Contact- someone whom you trust who will be there for you.  · Call your local CRISIS HOTLINE 739-8482 or 985-410-3275  · Call your local Children's Mobile Crisis Response Team [place] (077) 645-0205 or www.[employer]  · Call the toll free National Suicide Prevention Hotlines    · National Suicide Prevention Lifeline 920-965-BMNA (8097)  · National Hope Line Network 800-SUICIDE (749-7108)

## 2020-02-08 NOTE — PROGRESS NOTES
Received report from previous nurse, Isabelle, regarding prior 12 hours.  POC reviewed with pt, white board updated, pt verbalized understanding, call light within reach.  Pt encouraged to call before getting up.  Bed in lowest position, treaded slippers on.

## 2020-02-08 NOTE — PROGRESS NOTES
Pt dc'd at 1315. IV and monitor removed; monitor room notified. Pt left unit via wheelchair with this RN and pt wife. Personal belongings with pt when leaving unit. Pt given discharge instructions prior to leaving unit including prescription and when to visit with physician; verbalizes understanding. Copy of discharge instructions with pt and in the chart.Plan to draw BMP, Mag and lactic acid after magnesium infusion just prior to pt D/C. [facility] Yellow team stated they will contact pt by phone if any lab results need to be addressed.

## 2020-02-09 LAB
BACTERIA BLD CULT: NORMAL
BACTERIA BLD CULT: NORMAL
SIGNIFICANT IND 70042: NORMAL
SIGNIFICANT IND 70042: NORMAL
SITE SITE: NORMAL
SITE SITE: NORMAL
SOURCE SOURCE: NORMAL
SOURCE SOURCE: NORMAL

## 2020-02-09 NOTE — PROGRESS NOTES
Phone call to patient to update on results.   Unable to reach listed number. Called and spoke to patient's wife Jodi Chang.   Updated that H. pylori test on the duodenal biopsy was normal. Potassium and Magnesium were both normal. Lactic acid still elevated. [facility] about these abnormal labs.   Pt wife verbalized understanding.

## 2020-02-23 NOTE — DOCUMENTATION QUERY
[facility]                                                                       Query Response Note      PATIENT:               CAITLIN GREEN  ACCT #:                  [id number]  MRN:                     [id number]  :                      1950  ADMIT DATE:       2020 7:34 AM  DISCH DATE:        2020 1:26 PM  RESPONDING  PROVIDER #:        [id number]           QUERY TEXT:    Please clarify in documentation the relationship, if any, between   Acute Kidney Injury and Sepsis    *If an appropriate response is not listed below, please respond with a new note:    The patient's Clinical Indicators include:  ED Report  Dr. DWAYNE Young  Final Impression  Severe sepsis    H & P   Cosigned by Dr. SANDRO Wilkins  Assessment/Plan  Sepsis  -SIRS 3/4 for tachycardia, tachypnea, leukocytosis  -elevated pro calcitonin at 1.08  -likely source is lungs-community acquired pneumonia    EUFEMIA   -?acute on chronic kidney disease  -Maintenance IVF with LR  -likely hypovolemic  -f/u CPK levels  -renal U/S ordered  Options provided:   -- Acute Kidney Injury is due to or associated with Sepsis   -- Unrelated to each other   -- Unable to determine      Query created by: Nannette Almazan on 2020 9:35 AM    RESPONSE TEXT:    Acute Kidney Injury is due to or associated with Sepsis          Electronically signed by:  GORGE WILKINS MD 2020 7:52 AM

## 2021-05-05 NOTE — ASSESSMENT & PLAN NOTE
- improving  - likely hypovolemic  - renal U/S showed mildly increased echogenicity of the kidneys suggestive of medical renal disease and a small lower pole left renal cyst  - ctm   1345 - Patient took off tele monitor, refuses to put it back on, states \"I am tired of holding it\".  Monitor room notified.

## (undated) DEVICE — CONTAINER, SPECIMEN, STERILE

## (undated) DEVICE — FILM CASSETTE ENDO

## (undated) DEVICE — KIT CUSTOM PROCEDURE SINGLE FOR ENDO  (15/CA)

## (undated) DEVICE — BITE BLOCK ADULT 60FR (100EA/CA)